# Patient Record
Sex: FEMALE | Race: WHITE | NOT HISPANIC OR LATINO | Employment: UNEMPLOYED | URBAN - METROPOLITAN AREA
[De-identification: names, ages, dates, MRNs, and addresses within clinical notes are randomized per-mention and may not be internally consistent; named-entity substitution may affect disease eponyms.]

---

## 2017-01-04 ENCOUNTER — TELEPHONE (OUTPATIENT)
Dept: FAMILY MEDICINE CLINIC | Facility: CLINIC | Age: 57
End: 2017-01-04

## 2017-01-04 NOTE — TELEPHONE ENCOUNTER
Pt called. She said we scheduled her with dr ellsworth and she talked to you about this. She has seen him before and doesn't want to see him again. Can you please set her up with a different Rheum??

## 2017-01-30 ENCOUNTER — OFFICE VISIT (OUTPATIENT)
Dept: FAMILY MEDICINE CLINIC | Facility: CLINIC | Age: 57
End: 2017-01-30

## 2017-01-30 VITALS
HEART RATE: 91 BPM | WEIGHT: 214 LBS | HEIGHT: 66 IN | BODY MASS INDEX: 34.39 KG/M2 | SYSTOLIC BLOOD PRESSURE: 118 MMHG | RESPIRATION RATE: 16 BRPM | OXYGEN SATURATION: 98 % | DIASTOLIC BLOOD PRESSURE: 70 MMHG

## 2017-01-30 DIAGNOSIS — F51.01 PRIMARY INSOMNIA: ICD-10-CM

## 2017-01-30 DIAGNOSIS — R42 VERTIGO: ICD-10-CM

## 2017-01-30 DIAGNOSIS — H65.02 ACUTE SEROUS OTITIS MEDIA OF LEFT EAR, RECURRENCE NOT SPECIFIED: ICD-10-CM

## 2017-01-30 DIAGNOSIS — I10 ESSENTIAL HYPERTENSION: Primary | ICD-10-CM

## 2017-01-30 PROCEDURE — 99213 OFFICE O/P EST LOW 20 MIN: CPT | Performed by: FAMILY MEDICINE

## 2017-01-30 PROCEDURE — 96372 THER/PROPH/DIAG INJ SC/IM: CPT | Performed by: FAMILY MEDICINE

## 2017-01-30 RX ORDER — MONTELUKAST SODIUM 10 MG/1
10 TABLET ORAL NIGHTLY
Qty: 30 TABLET | Refills: 5 | Status: SHIPPED | OUTPATIENT
Start: 2017-01-30 | End: 2017-05-12

## 2017-01-30 RX ORDER — DEXAMETHASONE SODIUM PHOSPHATE 4 MG/ML
4 INJECTION, SOLUTION INTRA-ARTICULAR; INTRALESIONAL; INTRAMUSCULAR; INTRAVENOUS; SOFT TISSUE ONCE
Status: COMPLETED | OUTPATIENT
Start: 2017-01-30 | End: 2017-01-30

## 2017-01-30 RX ORDER — FLUTICASONE PROPIONATE 50 MCG
2 SPRAY, SUSPENSION (ML) NASAL DAILY
Qty: 1 EACH | Refills: 5 | Status: SHIPPED | OUTPATIENT
Start: 2017-01-30 | End: 2017-03-01

## 2017-01-30 RX ADMIN — DEXAMETHASONE SODIUM PHOSPHATE 4 MG: 4 INJECTION, SOLUTION INTRA-ARTICULAR; INTRALESIONAL; INTRAMUSCULAR; INTRAVENOUS; SOFT TISSUE at 13:54

## 2017-01-30 NOTE — PROGRESS NOTES
Subjective   Candie Arias is a 57 y.o. female presenting with   Chief Complaint   Patient presents with   • Dizziness     having dizzy spells         HPI Comments: 57-year-old  white female nonsmoker comes in today with the complaint that she has a feeling that her left ear is plugged up.  She tells me that she has trouble with chronic allergies and she takes an anti-histamine daily.  She is not on any nasal spray.  She does not see an allergist.    She also says that a couple of days ago she woke up in the early morning hours dizzy.  She said it lasted for a few minutes and she did vomit.  She had this recur yesterday but it only lasted for a few minutes and if she held her head very still she did not vomit.he admits that for some time she has intermittent tinnitus and her  tells her that she is getting hard of hearing.  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    Dizziness          The following portions of the patient's history were reviewed and updated as appropriate: current medications, past family history, past medical history, past social history, past surgical history and problem list.    Review of Systems   HENT: Positive for ear pain and hearing loss.    Neurological: Positive for dizziness.   All other systems reviewed and are negative.      Objective   Physical Exam   Constitutional: She is oriented to person, place, and time. She appears well-developed and well-nourished. No distress.   HENT:   Head: Normocephalic and atraumatic.   Right Ear: External ear normal.   Left Ear: Tympanic membrane is retracted. A middle ear effusion is present.   Nose: Nose normal.   Mouth/Throat: Oropharynx is clear and moist.   Eyes: EOM are normal. Pupils are equal, round, and reactive to light. No scleral icterus.   Neck: Normal range of motion. Neck supple. No thyromegaly present.   Cardiovascular: Normal rate and regular rhythm.    Pulmonary/Chest: Effort normal and breath sounds normal.    Musculoskeletal: Normal range of motion. She exhibits no edema or tenderness.   Lymphadenopathy:     She has no cervical adenopathy.   Neurological: She is alert and oriented to person, place, and time. No cranial nerve deficit. Coordination normal.   Skin: Skin is warm and dry.   Psychiatric: She has a normal mood and affect.   Nursing note and vitals reviewed.      Assessment/Plan   Candie was seen today for dizziness.    Diagnoses and all orders for this visit:    Essential hypertension    Primary insomnia    Acute serous otitis media of left ear, recurrence not specified  -     dexamethasone (DECADRON) injection 4 mg; Inject 1 mL into the shoulder, thigh, or buttocks 1 (One) Time.    Vertigo    Other orders  -     fluticasone (FLONASE) 50 MCG/ACT nasal spray; 2 sprays into each nostril Daily for 30 days.  -     montelukast (SINGULAIR) 10 MG tablet; Take 1 tablet by mouth Every Night.                   I would like him to return for another visit in 6 month(s)

## 2017-01-30 NOTE — MR AVS SNAPSHOT
Candie Arias   1/30/2017 1:45 PM   Office Visit    Dept Phone:  [phone]   Encounter #:  [id number]    Provider:  Kyle Moreira MD   Department:  [facility] PRIMARY CARE                Your Full Care Plan              Today's Medication Changes          These changes are accurate as of: 1/30/17  1:59 PM.  If you have any questions, ask your nurse or doctor.               New Medication(s)Ordered:     fluticasone 50 MCG/ACT nasal spray   Commonly known as:  FLONASE   2 sprays into each nostril Daily for 30 days.   Started by:  Kyle Moreira MD       montelukast 10 MG tablet   Commonly known as:  SINGULAIR   Take 1 tablet by mouth Every Night.   Started by:  Kyle Moreira MD            Where to Get Your Medications      These medications were sent to [id number] [place] [address] - [phone] [facility] [phone]   [id number] [place] [address]     Phone:  [phone]     fluticasone 50 MCG/ACT nasal spray    montelukast 10 MG tablet                  Your Updated Medication List          This list is accurate as of: 1/30/17  1:59 PM.  Always use your most recent med list.                ANTIHISTAMINE PO       diclofenac 75 MG EC tablet   Commonly known as:  VOLTAREN   Take 1 Tab by mouth two times a day.       fluticasone 50 MCG/ACT nasal spray   Commonly known as:  FLONASE   2 sprays into each nostril Daily for 30 days.       lisinopril-hydrochlorothiazide 20-12.5 MG per tablet   Commonly known as:  PRINZIDE,ZESTORETIC   Take 1 tablet by mouth Daily.       montelukast 10 MG tablet   Commonly known as:  SINGULAIR   Take 1 tablet by mouth Every Night.       traZODone 100 MG tablet   Commonly known as:  DESYREL   Take 1 tablet by mouth Every Night.               You Were Diagnosed With        Codes Comments    Essential hypertension    -  Primary ICD-10-CM: I10  ICD-9-CM: 401.9     Primary insomnia     ICD-10-CM: F51.01  ICD-9-CM: 307.42     Acute serous otitis media of left ear, recurrence not specified     ICD-10-CM: H65.02  ICD-9-CM: 381.01     Vertigo     ICD-10-CM: R42  ICD-9-CM: 780.4       Medications to be Given to You by a Medical Professional     Due       Frequency    (none) dexamethasone (DECADRON) injection 4 mg  Once      Instructions    This is a very nice 57-year-old who is here for acute serous otitis media of the left ear along with vertigo.  I will prescribe Flonase and Singulair, but if she does not get relief I would suggest we consult audiology.    She also is not getting much sleep with the trazodone.  If this persists I would suggest we increase the dose.     Patient Instructions History      Upcoming Appointments     Visit Type Date Time Department    OFFICE VISIT 2017  1:45 PM [facility]    OFFICE VISIT 2017  1:15 PM [facility]    OFFICE VISIT 2017  1:30 PM [facility]      [employer] Signup     [employer] allows you to send messages to your doctor, view your test results, renew your prescriptions, schedule appointments, and more. To sign up, go to [employer] and click on the Sign Up Now link in the New User? box. Enter your [employer] Activation Code exactly as it appears below along with the last four digits of your Social Security Number and your Date of Birth () to complete the sign-up process. If you do not sign up before the expiration date, you must request a new code.    [employer] Activation Code: [email or web address]  Expires: 2017  1:59 PM    If you have questions, you can email [email or web address] or call [phone] to talk to our [employer] staff. Remember, [employer] is NOT to be used for urgent needs. For medical emergencies, dial 911.               Other Info from Your Visit           Your Appointments     2017  1:15 PM EST   Office Visit with Yen Correa MD   [facility]  "[facility] (--)    [address]  [facility] [address] [phone]           Arrive 15 minutes prior to appointment.            May 12, 2017  1:30 PM EDT   Office Visit with Ana Duque MD   [facility] (--)    [address] [facility] [place] [address] [phone]           Arrive 15 minutes prior to appointment.              Allergies     Rocephin [Ceftriaxone]      INDUCED FEVER      Reason for Visit     Dizziness having dizzy spells       Vital Signs     Blood Pressure Pulse Respirations Height Weight Oxygen Saturation    118/70 91 16 66\" (167.6 cm) 214 lb (97.1 kg) 98%    Breastfeeding? Body Mass Index Smoking Status             No 34.54 kg/m2 Never Smoker         Problems and Diagnoses Noted     Middle ear infection    High blood pressure    Difficulty falling or staying asleep    Vertigo      Medications Administered     dexamethasone (DECADRON) injection 4 mg                      "

## 2017-01-30 NOTE — PATIENT INSTRUCTIONS
This is a very nice 57-year-old who is here for acute serous otitis media of the left ear along with vertigo.  I will prescribe Flonase and Singulair, but if she does not get relief I would suggest we consult audiology.    She also is not getting much sleep with the trazodone.  If this persists I would suggest we increase the dose.

## 2017-01-31 ENCOUNTER — OFFICE VISIT (OUTPATIENT)
Dept: ORTHOPEDIC SURGERY | Facility: CLINIC | Age: 57
End: 2017-01-31

## 2017-01-31 VITALS — BODY MASS INDEX: 33.75 KG/M2 | HEIGHT: 66 IN | TEMPERATURE: 98.6 F | WEIGHT: 210 LBS

## 2017-01-31 DIAGNOSIS — M25.561 CHRONIC PAIN OF RIGHT KNEE: Primary | ICD-10-CM

## 2017-01-31 DIAGNOSIS — T84.84XA PAINFUL TOTAL KNEE REPLACEMENT, INITIAL ENCOUNTER (HCC): ICD-10-CM

## 2017-01-31 DIAGNOSIS — Z96.652 HX OF TOTAL KNEE ARTHROPLASTY, LEFT: ICD-10-CM

## 2017-01-31 DIAGNOSIS — G89.29 CHRONIC PAIN OF RIGHT KNEE: Primary | ICD-10-CM

## 2017-01-31 DIAGNOSIS — Z96.659 PAINFUL TOTAL KNEE REPLACEMENT, INITIAL ENCOUNTER (HCC): ICD-10-CM

## 2017-01-31 PROCEDURE — 73562 X-RAY EXAM OF KNEE 3: CPT | Performed by: ORTHOPAEDIC SURGERY

## 2017-01-31 PROCEDURE — 99203 OFFICE O/P NEW LOW 30 MIN: CPT | Performed by: ORTHOPAEDIC SURGERY

## 2017-02-06 ENCOUNTER — HOSPITAL ENCOUNTER (OUTPATIENT)
Dept: GENERAL RADIOLOGY | Facility: HOSPITAL | Age: 57
Discharge: HOME | End: 2017-02-06
Admitting: INTERNAL MEDICINE

## 2017-02-06 ENCOUNTER — HOSPITAL ENCOUNTER (OUTPATIENT)
Dept: NUCLEAR MEDICINE | Facility: HOSPITAL | Age: 57
Discharge: HOME | End: 2017-02-06
Attending: ORTHOPAEDIC SURGERY

## 2017-02-06 ENCOUNTER — LAB (OUTPATIENT)
Dept: LAB | Facility: HOSPITAL | Age: 57
End: 2017-02-06

## 2017-02-06 DIAGNOSIS — G89.29 CHRONIC PAIN OF RIGHT KNEE: ICD-10-CM

## 2017-02-06 DIAGNOSIS — Z96.652 HX OF TOTAL KNEE ARTHROPLASTY, LEFT: ICD-10-CM

## 2017-02-06 DIAGNOSIS — R52 PAIN: ICD-10-CM

## 2017-02-06 DIAGNOSIS — M25.561 CHRONIC PAIN OF RIGHT KNEE: ICD-10-CM

## 2017-02-06 LAB
BASOPHILS # BLD AUTO: 0.03 10*3/MM3 (ref 0–0.2)
BASOPHILS NFR BLD AUTO: 0.4 % (ref 0–1.5)
CRP SERPL-MCNC: 0.56 MG/DL (ref 0–0.5)
DEPRECATED RDW RBC AUTO: 44 FL (ref 37–54)
EOSINOPHIL # BLD AUTO: 0.18 10*3/MM3 (ref 0–0.7)
EOSINOPHIL NFR BLD AUTO: 2.5 % (ref 0.3–6.2)
ERYTHROCYTE [DISTWIDTH] IN BLOOD BY AUTOMATED COUNT: 14 % (ref 11.7–13)
ERYTHROCYTE [SEDIMENTATION RATE] IN BLOOD: 4 MM/HR (ref 0–30)
HCT VFR BLD AUTO: 43.2 % (ref 35.6–45.5)
HGB BLD-MCNC: 14.2 G/DL (ref 11.9–15.5)
IMM GRANULOCYTES # BLD: 0.04 10*3/MM3 (ref 0–0.03)
IMM GRANULOCYTES NFR BLD: 0.6 % (ref 0–0.5)
LYMPHOCYTES # BLD AUTO: 1.37 10*3/MM3 (ref 0.9–4.8)
LYMPHOCYTES NFR BLD AUTO: 19.1 % (ref 19.6–45.3)
MCH RBC QN AUTO: 28.3 PG (ref 26.9–32)
MCHC RBC AUTO-ENTMCNC: 32.9 G/DL (ref 32.4–36.3)
MCV RBC AUTO: 86.2 FL (ref 80.5–98.2)
MONOCYTES # BLD AUTO: 0.39 10*3/MM3 (ref 0.2–1.2)
MONOCYTES NFR BLD AUTO: 5.4 % (ref 5–12)
NEUTROPHILS # BLD AUTO: 5.18 10*3/MM3 (ref 1.9–8.1)
NEUTROPHILS NFR BLD AUTO: 72 % (ref 42.7–76)
PLATELET # BLD AUTO: 266 10*3/MM3 (ref 140–500)
PMV BLD AUTO: 9.7 FL (ref 6–12)
RBC # BLD AUTO: 5.01 10*6/MM3 (ref 3.9–5.2)
WBC NRBC COR # BLD: 7.19 10*3/MM3 (ref 4.5–10.7)

## 2017-02-06 PROCEDURE — 73030 X-RAY EXAM OF SHOULDER: CPT

## 2017-02-06 PROCEDURE — 0 TECHNETIUM MEDRONATE KIT: Performed by: ORTHOPAEDIC SURGERY

## 2017-02-06 PROCEDURE — 78315 BONE IMAGING 3 PHASE: CPT

## 2017-02-06 PROCEDURE — 85652 RBC SED RATE AUTOMATED: CPT

## 2017-02-06 PROCEDURE — A9503 TC99M MEDRONATE: HCPCS | Performed by: ORTHOPAEDIC SURGERY

## 2017-02-06 PROCEDURE — 36415 COLL VENOUS BLD VENIPUNCTURE: CPT

## 2017-02-06 PROCEDURE — 86140 C-REACTIVE PROTEIN: CPT

## 2017-02-06 PROCEDURE — 85025 COMPLETE CBC W/AUTO DIFF WBC: CPT

## 2017-02-06 RX ORDER — TC 99M MEDRONATE 20 MG/10ML
22.5 INJECTION, POWDER, LYOPHILIZED, FOR SOLUTION INTRAVENOUS
Status: COMPLETED | OUTPATIENT
Start: 2017-02-06 | End: 2017-02-06

## 2017-02-06 RX ADMIN — Medication 22.5 MILLICURIE: at 09:00

## 2017-02-07 ENCOUNTER — TELEPHONE (OUTPATIENT)
Dept: FAMILY MEDICINE CLINIC | Facility: CLINIC | Age: 57
End: 2017-02-07

## 2017-02-07 NOTE — TELEPHONE ENCOUNTER
Pt called stating she is still dizzy with her ear still feeling like it is plugged up states  it has been a week that she was treated for a ear infection what to do now?

## 2017-02-07 NOTE — TELEPHONE ENCOUNTER
Called pt informed her of answer per dr santoro she said she will call back on Monday and leave a message with dr smith

## 2017-02-21 ENCOUNTER — LAB REQUISITION (OUTPATIENT)
Dept: LAB | Facility: HOSPITAL | Age: 57
End: 2017-02-21

## 2017-02-21 ENCOUNTER — CONSULT (OUTPATIENT)
Dept: ORTHOPEDIC SURGERY | Facility: CLINIC | Age: 57
End: 2017-02-21

## 2017-02-21 VITALS — TEMPERATURE: 97.1 F | HEIGHT: 66 IN | WEIGHT: 210 LBS | BODY MASS INDEX: 33.75 KG/M2

## 2017-02-21 DIAGNOSIS — G89.29 CHRONIC PAIN OF LEFT KNEE: Primary | ICD-10-CM

## 2017-02-21 DIAGNOSIS — Z00.00 ENCOUNTER FOR GENERAL ADULT MEDICAL EXAMINATION WITHOUT ABNORMAL FINDINGS: ICD-10-CM

## 2017-02-21 DIAGNOSIS — M25.562 CHRONIC PAIN OF LEFT KNEE: Primary | ICD-10-CM

## 2017-02-21 PROCEDURE — 89050 BODY FLUID CELL COUNT: CPT | Performed by: NURSE PRACTITIONER

## 2017-02-21 PROCEDURE — 87205 SMEAR GRAM STAIN: CPT | Performed by: NURSE PRACTITIONER

## 2017-02-21 PROCEDURE — 87015 SPECIMEN INFECT AGNT CONCNTJ: CPT | Performed by: NURSE PRACTITIONER

## 2017-02-21 PROCEDURE — 87070 CULTURE OTHR SPECIMN AEROBIC: CPT | Performed by: NURSE PRACTITIONER

## 2017-02-21 PROCEDURE — 99203 OFFICE O/P NEW LOW 30 MIN: CPT | Performed by: NURSE PRACTITIONER

## 2017-02-21 RX ORDER — PIROXICAM 10 MG/1
10 CAPSULE ORAL DAILY
COMMUNITY
End: 2017-05-12

## 2017-02-21 NOTE — PROGRESS NOTES
Patient: Candie Arias  YOB: 1960 57 y.o. female  Medical Record Number: [id number]    Chief Complaints:   Chief Complaint   Patient presents with   • Left Knee - Pain       History of Present Illness:Candie Arias is a 57 y.o. female who presents with complaints of increased left knee pain and stiffness.  The patient had a previous left total knee replacement approximately 2 and half years ago by Dr. Barney.  Within the first week of surgery the patient ended up with an infection and then had an I&D and poly-exchange as well as 6 weeks worth of IV antibiotics.  She then was taken off all antibiotics with no further infection workup at that time.  Patient saw the surgeon about a year ago for follow-up for her knee, was having some stiffness, and was told she had some scar tissue and extra bone that had formed and that surgery at that point really wasn't an option.  She never went back.  She recently saw her family doctor who told her to come here for second opinion.  Patient's major complaint is increased pain and stiffness but has progressively gotten worse, worse over the last 3-4 months.  Patient did have a fall last year or reports the symptoms are going on prior to the fall.  She denies any fever or chills.  She saw Dr. Correa a couple weeks ago and the patient had a normal sedimentation rate only mild elevated CRP and a normal total body bone scan.  No knee aspiration has been done yet.    Allergies:   Allergies   Allergen Reactions   • Rocephin [Ceftriaxone] Other (See Comments)     FEVER  INDUCED FEVER       Medications:   Current Outpatient Prescriptions   Medication Sig Dispense Refill   • piroxicam (FELDENE) 10 MG capsule Take 10 mg by mouth Daily.     • fluticasone (FLONASE) 50 MCG/ACT nasal spray 2 sprays into each nostril Daily for 30 days. 1 each 5   • lisinopril-hydrochlorothiazide (PRINZIDE,ZESTORETIC) 20-12.5 MG per tablet Take 1 tablet by mouth Daily. 90 tablet 1   •  "montelukast (SINGULAIR) 10 MG tablet Take 1 tablet by mouth Every Night. 30 tablet 5   • traZODone (DESYREL) 100 MG tablet Take 1 tablet by mouth Every Night. 30 tablet 5   • Triprolidine-Pseudoephedrine (ANTIHISTAMINE PO) Take by mouth.       No current facility-administered medications for this visit.          The following portions of the patient's history were reviewed and updated as appropriate: allergies, current medications, past family history, past medical history, past social history, past surgical history and problem list.    Review of Systems:   A 14 point review of systems was performed. All systems negative except pertinent positives/negative listed in HPI above    Physical Exam:   Vitals:    02/21/17 1359   Temp: 97.1 °F (36.2 °C)   Weight: 210 lb (95.3 kg)   Height: 66\" (167.6 cm)       General: A and O x 3, ASA, NAD    SCLERA:    Normal    DENTITION:   Normal  Skin clear no unusual lesions noted  Left knee patient has a well-healed midline surgical incision noted 83° flexion neutron extension with no appreciable effusion noted.  No instability.  Calf is soft and nontender.    Radiology:  Xrays 3views (ap,lateral, sunrise) left knee were ordered and reviewed today secondary to pain and stiffness and show well-placed well positioned left total knee replacement with calcifications and heterotopic ossifications noted about the patella.  No obvious signs of loosening.  Comparative views are unchanged    Assessment/Plan:  Painful and stiff left total knee replacement with a history of infection    Dr. No saw the patient as well.  At this point we need to completely rule out infection as the primary cause of the patient's symptoms.  We were able to aspirate 10 cc of clear fluid from her knee sent off for cell count and culture and sensitivity.  Patient will follow-up with Dr. No in 2 weeks to discuss results and options.  "

## 2017-02-22 LAB
APPEARANCE FLD: ABNORMAL
COLOR FLD: ABNORMAL
METHOD: ABNORMAL
NUC CELL # FLD: 3 /MM3
RBC # FLD AUTO: 4350 /MM3

## 2017-02-27 LAB
BACTERIA FLD CULT: NORMAL
GRAM STN SPEC: NORMAL

## 2017-03-07 ENCOUNTER — OFFICE VISIT (OUTPATIENT)
Dept: ORTHOPEDIC SURGERY | Facility: CLINIC | Age: 57
End: 2017-03-07

## 2017-03-07 VITALS — BODY MASS INDEX: 33.75 KG/M2 | HEIGHT: 66 IN | WEIGHT: 210 LBS

## 2017-03-07 DIAGNOSIS — Z96.652 STATUS POST TOTAL LEFT KNEE REPLACEMENT: Primary | ICD-10-CM

## 2017-03-07 PROCEDURE — 99212 OFFICE O/P EST SF 10 MIN: CPT | Performed by: ORTHOPAEDIC SURGERY

## 2017-03-07 NOTE — PROGRESS NOTES
"Patient: Candie Arias  YOB: 1960 57 y.o. female  Medical Record Number: [id number]    Chief Complaints:   Chief Complaint   Patient presents with   • Left Knee - Follow-up       History of Present Illness:Candie Arias is a 57 y.o. female who presents for follow-up of  of her left total knee replacement.  She had labs drawn is here for evaluation.  She still complaining primarily of stiffness and discomfort with flexion of the knee.    Allergies:   Allergies   Allergen Reactions   • Rocephin [Ceftriaxone] Other (See Comments)     FEVER  INDUCED FEVER       Medications:   Current Outpatient Prescriptions   Medication Sig Dispense Refill   • lisinopril-hydrochlorothiazide (PRINZIDE,ZESTORETIC) 20-12.5 MG per tablet Take 1 tablet by mouth Daily. 90 tablet 1   • montelukast (SINGULAIR) 10 MG tablet Take 1 tablet by mouth Every Night. 30 tablet 5   • piroxicam (FELDENE) 10 MG capsule Take 10 mg by mouth Daily.     • traZODone (DESYREL) 100 MG tablet Take 1 tablet by mouth Every Night. 30 tablet 5   • Triprolidine-Pseudoephedrine (ANTIHISTAMINE PO) Take by mouth.       No current facility-administered medications for this visit.          The following portions of the patient's history were reviewed and updated as appropriate: allergies, current medications, past family history, past medical history, past social history, past surgical history and problem list.    Review of Systems:   A 14 point review of systems was performed. All systems negative except pertinent positives/negative listed in HPI above    Physical Exam:   Vitals:    03/07/17 1130   Weight: 210 lb (95.3 kg)   Height: 66\" (167.6 cm)       General: A and O x 3, ASA, NAD    SCLERA:    Normal    DENTITION:   Normal  Knee is fairly stiff she flexes about 90° there is no obvious effusion the skin looks fine today    Radiology:  Xrays 3views (ap,lateral, sunrise) taken previously demonstratinga well positioned knee replacement without " evidence of wear, loosening or osteolysis.  There is calcification of the patellar tendon and the quadricep tendon most evident on the lateral view    Sedimentation rate and C-reactive protein are both normal.  The knee aspiration shows really red blood cells and is not consistent with infection    Assessment/Plan:  Stiff left total knee secondary to scar tissue and calcification of the extensor mechanism.  I recommended against any further surgery.  I recommended stationary bike and working on stretching.  We'll see her back on a when necessary basis.

## 2017-04-05 ENCOUNTER — OFFICE VISIT (OUTPATIENT)
Dept: FAMILY MEDICINE CLINIC | Facility: CLINIC | Age: 57
End: 2017-04-05

## 2017-04-05 VITALS
HEIGHT: 66 IN | WEIGHT: 210 LBS | SYSTOLIC BLOOD PRESSURE: 136 MMHG | HEART RATE: 117 BPM | BODY MASS INDEX: 33.75 KG/M2 | TEMPERATURE: 98.3 F | RESPIRATION RATE: 16 BRPM | DIASTOLIC BLOOD PRESSURE: 74 MMHG

## 2017-04-05 DIAGNOSIS — H65.02 ACUTE SEROUS OTITIS MEDIA OF LEFT EAR, RECURRENCE NOT SPECIFIED: ICD-10-CM

## 2017-04-05 DIAGNOSIS — R42 VERTIGO: ICD-10-CM

## 2017-04-05 DIAGNOSIS — T84.84XS PAINFUL TOTAL KNEE REPLACEMENT, SEQUELA: ICD-10-CM

## 2017-04-05 DIAGNOSIS — Z96.659 PAINFUL TOTAL KNEE REPLACEMENT, SEQUELA: ICD-10-CM

## 2017-04-05 DIAGNOSIS — I10 ESSENTIAL HYPERTENSION: Primary | ICD-10-CM

## 2017-04-05 PROCEDURE — 99213 OFFICE O/P EST LOW 20 MIN: CPT | Performed by: FAMILY MEDICINE

## 2017-04-05 RX ORDER — LORATADINE 10 MG/1
CAPSULE, LIQUID FILLED ORAL
COMMUNITY
End: 2021-05-24

## 2017-04-05 NOTE — PROGRESS NOTES
Subjective   Candie Arias is a 57 y.o. female presenting with   Chief Complaint   Patient presents with   • Dizziness     recurrent dizzy spells    • URI     head congestion  sore throat  headache         HPI Comments: 57-year-old  white female nonsmoker comes in today for recurrent vertigo.  She has found if she moves her head rapidly in one direction she can set off her dizzy spells.  However they normally are extremely transient.  She normally sleeps on her right side and 2 weeks ago woke up in the middle of the night with severe vertigo.  She does not know for sure which side she was on when this happened but for 2 hours she had to lie on the floor with her eyes closed or else she was throwing up.  She is on the Singulair and Claritin but still has head congestion.  She does not have any hearing loss or tinnitus.    She also went to a second orthopedist for consult on her left knee.  She says it hurts all the time and she has a lot of trouble with stairs.  If she sits for very long it becomes extremely stiff and sore as well.  The second orthopedist agreed with the first that there was very limited she can do.  However she is now seeing a new rheumatologist who told her she knows of a specialist in [place] who is known to be able to fix knee replacements that have gone bad.  I told her that if this is affecting her quality of life that badly I would consider having her  drive her to [place].    Dizziness   Associated symptoms include arthralgias and congestion.   URI    Associated symptoms include congestion and rhinorrhea.        The following portions of the patient's history were reviewed and updated as appropriate: current medications, past family history, past medical history, past social history, past surgical history and problem list.    Review of Systems   HENT: Positive for congestion and rhinorrhea.    Musculoskeletal: Positive for arthralgias.   Neurological: Positive for dizziness.    All other systems reviewed and are negative.      Objective   Physical Exam   Constitutional: She is oriented to person, place, and time. She appears well-developed and well-nourished.   HENT:   Head: Normocephalic and atraumatic.   Left Ear: A middle ear effusion is present.   Nose: Mucosal edema and rhinorrhea (clear rhinorrhea) present.   Eyes: EOM are normal. Pupils are equal, round, and reactive to light. No scleral icterus.   Neck: Normal range of motion. Neck supple. No JVD present. No thyromegaly present.   Cardiovascular: Regular rhythm, normal heart sounds and intact distal pulses.  Tachycardia present.    No murmur heard.  Pulmonary/Chest: Effort normal and breath sounds normal. No respiratory distress.   Musculoskeletal: She exhibits tenderness (tenderness to range of motion of left knee without any gross instability). She exhibits no edema or deformity.   Lymphadenopathy:     She has no cervical adenopathy.   Neurological: She is alert and oriented to person, place, and time. No cranial nerve deficit. Coordination normal.   Skin: Skin is warm and dry.   Psychiatric: She has a normal mood and affect. Her behavior is normal.   Nursing note and vitals reviewed.      Assessment/Plan   Candie was seen today for dizziness and uri.    Diagnoses and all orders for this visit:    Essential hypertension    Vertigo  -     Ambulatory Referral to ENT (Otolaryngology)    Painful total knee replacement, sequela    Acute serous otitis media of left ear, recurrence not specified                   I would like him to return for another visit in 6 month(s)

## 2017-04-05 NOTE — PATIENT INSTRUCTIONS
This is a very nice 57-year-old who is here for recurrent vertigo.  Because this has become more frequent and more intense, I will request an ENT consult.

## 2017-04-13 ENCOUNTER — TELEPHONE (OUTPATIENT)
Dept: FAMILY MEDICINE CLINIC | Facility: CLINIC | Age: 57
End: 2017-04-13

## 2017-04-13 NOTE — TELEPHONE ENCOUNTER
Pt is having a balance test and she has to stop taking medication,she is asking how to stop taking the trazodone?   Does she need to wean her self off of it or can she just stop taking it?

## 2017-05-02 RX ORDER — LISINOPRIL AND HYDROCHLOROTHIAZIDE 20; 12.5 MG/1; MG/1
TABLET ORAL
Qty: 30 TABLET | Refills: 5 | Status: SHIPPED | OUTPATIENT
Start: 2017-05-02 | End: 2017-10-27 | Stop reason: SDUPTHER

## 2017-05-12 ENCOUNTER — OFFICE VISIT (OUTPATIENT)
Dept: SURGERY | Facility: CLINIC | Age: 57
End: 2017-05-12

## 2017-05-12 VITALS
DIASTOLIC BLOOD PRESSURE: 69 MMHG | HEIGHT: 66 IN | OXYGEN SATURATION: 98 % | BODY MASS INDEX: 33.62 KG/M2 | WEIGHT: 209.2 LBS | SYSTOLIC BLOOD PRESSURE: 101 MMHG | HEART RATE: 83 BPM

## 2017-05-12 DIAGNOSIS — N60.92 ATYPICAL LOBULAR HYPERPLASIA OF LEFT BREAST: ICD-10-CM

## 2017-05-12 DIAGNOSIS — Z80.3 FH: BREAST CANCER: ICD-10-CM

## 2017-05-12 DIAGNOSIS — N60.92 ATYPICAL DUCTAL HYPERPLASIA OF LEFT BREAST: ICD-10-CM

## 2017-05-12 DIAGNOSIS — D05.91 CARCINOMA IN SITU OF RIGHT BREAST: Primary | ICD-10-CM

## 2017-05-12 PROCEDURE — 99213 OFFICE O/P EST LOW 20 MIN: CPT | Performed by: SURGERY

## 2017-05-12 RX ORDER — CELECOXIB 200 MG/1
CAPSULE ORAL
COMMUNITY
Start: 2017-04-10 | End: 2020-09-09 | Stop reason: SDUPTHER

## 2017-05-18 ENCOUNTER — TELEPHONE (OUTPATIENT)
Dept: SURGERY | Facility: CLINIC | Age: 57
End: 2017-05-18

## 2017-06-12 ENCOUNTER — DOCUMENTATION (OUTPATIENT)
Dept: NUTRITION | Facility: HOSPITAL | Age: 57
End: 2017-06-12

## 2017-06-12 VITALS — HEIGHT: 66 IN | BODY MASS INDEX: 33.59 KG/M2 | WEIGHT: 209 LBS

## 2017-06-12 NOTE — PROGRESS NOTES
Adult Outpatient Nutrition  Assessment/PES    Patient Name:  Candie Arias  YOB: 1960  MRN: [id number]    Assessment Date:  6/12/2017     Comments:  [facility] healthy nutrition for breast cancer patients. The patient would like to be able to lose weight.  She has decreased her intake of sodium successfully over the last 6 weeks due to issues with vertigo.  She has knee issues and is unable to exercise but has used a stationary bike in the past. Encouraged patient to repair the bike they have or get a new one so that she can include exercise daily.  We discussed a low fat diet, with emphasis on plant based foods.  She was provided with meal planning tools, shopping lists, portion control and ideas for meals. Encouraged patient to increase intake of less calorically dense foods and decrease intake of high fat meats and snack foods.  Finally I encouraged the patient to track daily intake and exercise using a food log or an moiz, or computer.    Will be available as needed for any questions.              General Info       06/12/17 1057    Today's Session    Person(s) attending today's session Patient    General Information    Oncology patient? yes   h/o Breast cancer            Physical Findings       06/12/17 1059    Physical Findings/Assessment    Additional Documentation Physical Appearance (Group)    Physical Appearance    Overall Physical Appearance overweight;obese              Nutritional Info/Activity       06/12/17 1059    Nutritional Information    Have you had weight changes? No    Have you tried to lose weight before? Yes    List programs tried, date, and success Weight Watchers, lost 40#, gained back    Physical Activity    Are you currently involved in an activity/exercise program?  No    Reasons for Inactivity Other (comment)   has a stationary bike--needs replacement            Home Nutrition Report       06/12/17 1101    Home Nutrition  Report    Diet No specific            Estimated/Assessed Needs       06/12/17 1101    Calculation Measurements    Weight Used For Calculations 94.8 kg (209 lb)    Estimated/Assessed Energy Needs    Energy Need Method Kcal/kg    kcal/kg 15    15 Kcal/Kg (kcal) 1422.03    Estimated/Assessed Protein Needs    Weight Used for Protein Calculation 94.8 kg (209 lb)    Protein (gm/kg) 0.8    0.8 Gm Protein (gm) 75.84              Labs/Tests/Procedures/Meds       06/12/17 1101    Labs/Tests/Procedures/Meds    Diagnostic Test/Procedure Review reviewed    Labs/Tests Review Reviewed    Medication Review Reviewed, pertinent    Significant Vitals reviewed            Problem/Interventions:        Problem 1       06/12/17 1101    Nutrition Diagnoses Problem 1    Problem 1 Overweight/Obesity    Signs/Symptoms (evidenced by) Demonstrated Information Deficit                    Intervention Goal       06/12/17 1101    Intervention Goal    General Provide information regarding MNT for treatment/condition    Weight Appropriate weight loss                Electronically signed by:  Sherri Solano RD  06/12/17 11:02 AM

## 2017-10-27 RX ORDER — LISINOPRIL AND HYDROCHLOROTHIAZIDE 20; 12.5 MG/1; MG/1
TABLET ORAL
Qty: 30 TABLET | Refills: 0 | Status: SHIPPED | OUTPATIENT
Start: 2017-10-27 | End: 2017-11-28 | Stop reason: SDUPTHER

## 2017-11-29 RX ORDER — LISINOPRIL AND HYDROCHLOROTHIAZIDE 20; 12.5 MG/1; MG/1
TABLET ORAL
Qty: 30 TABLET | Refills: 2 | Status: SHIPPED | OUTPATIENT
Start: 2017-11-29 | End: 2018-02-12 | Stop reason: SDUPTHER

## 2018-02-12 ENCOUNTER — OFFICE VISIT (OUTPATIENT)
Dept: FAMILY MEDICINE CLINIC | Facility: CLINIC | Age: 58
End: 2018-02-12

## 2018-02-12 VITALS
SYSTOLIC BLOOD PRESSURE: 128 MMHG | TEMPERATURE: 98.6 F | HEIGHT: 66 IN | BODY MASS INDEX: 35.74 KG/M2 | OXYGEN SATURATION: 95 % | DIASTOLIC BLOOD PRESSURE: 78 MMHG | WEIGHT: 222.4 LBS | HEART RATE: 114 BPM | RESPIRATION RATE: 18 BRPM

## 2018-02-12 DIAGNOSIS — I10 ESSENTIAL HYPERTENSION: Primary | ICD-10-CM

## 2018-02-12 PROCEDURE — 99213 OFFICE O/P EST LOW 20 MIN: CPT | Performed by: FAMILY MEDICINE

## 2018-02-12 RX ORDER — LISINOPRIL AND HYDROCHLOROTHIAZIDE 20; 12.5 MG/1; MG/1
1 TABLET ORAL DAILY
Qty: 90 TABLET | Refills: 1 | Status: SHIPPED | OUTPATIENT
Start: 2018-02-12 | End: 2018-08-24 | Stop reason: SDUPTHER

## 2018-02-12 NOTE — PROGRESS NOTES
Subjective   Candie Arias is a 58 y.o. female presenting with   Chief Complaint   Patient presents with   • Hypertension        HPI Comments: 58-year-old white female nonsmoker here for routine follow-up for high blood pressure.  Her blood pressures well-controlled and she does not have any side effects from her medication.    She does see a rheumatologist named Dr. Castillo who does blood work on her on a regular basis.  She declines any further blood work today because she says she just had some drawn.  I did ask her to have that physician sent over a copy of the most recent lab.  He is looking into consult thing Dr. Sorto to have her left knee replacement redone since it hurts all the time.    Hypertension          The following portions of the patient's history were reviewed and updated as appropriate: current medications, past family history, past medical history, past social history, past surgical history and problem list.    Review of Systems   Musculoskeletal: Positive for arthralgias and gait problem.   All other systems reviewed and are negative.      Objective   Physical Exam   Constitutional: She is oriented to person, place, and time. She appears well-developed and well-nourished. No distress.   HENT:   Head: Normocephalic and atraumatic.   Eyes: EOM are normal. Pupils are equal, round, and reactive to light.   Neck: Normal range of motion. Neck supple. No thyromegaly present.   Cardiovascular: Normal rate, regular rhythm, normal heart sounds and intact distal pulses.  Exam reveals no friction rub.    No murmur heard.  Pulmonary/Chest: Effort normal and breath sounds normal. No respiratory distress. She has no wheezes.   Musculoskeletal: Normal range of motion. She exhibits tenderness (tenderness in the left knee).   Lymphadenopathy:     She has no cervical adenopathy.   Neurological: She is alert and oriented to person, place, and time.   Skin: Skin is warm and dry. She is not diaphoretic.    Psychiatric: She has a normal mood and affect. Her behavior is normal.   Nursing note and vitals reviewed.      Assessment/Plan   Candie was seen today for hypertension.    Diagnoses and all orders for this visit:    Essential hypertension    Other orders  -     lisinopril-hydrochlorothiazide (PRINZIDE,ZESTORETIC) 20-12.5 MG per tablet; Take 1 tablet by mouth Daily.                   I would like him to return for another visit in 6 month(s)

## 2018-02-12 NOTE — PATIENT INSTRUCTIONS
This is a very nice 58-year-old who is here for follow-up for her blood pressure.  It appears to be very well controlled and she does not have any side effects.  I would like her to call if there is a problem.

## 2018-05-10 ENCOUNTER — OFFICE VISIT (OUTPATIENT)
Dept: SURGERY | Facility: CLINIC | Age: 58
End: 2018-05-10

## 2018-05-10 VITALS
HEIGHT: 66 IN | BODY MASS INDEX: 35.36 KG/M2 | WEIGHT: 220 LBS | DIASTOLIC BLOOD PRESSURE: 76 MMHG | SYSTOLIC BLOOD PRESSURE: 138 MMHG

## 2018-05-10 DIAGNOSIS — Z80.3 FH: BREAST CANCER: ICD-10-CM

## 2018-05-10 DIAGNOSIS — N60.99 ATYPICAL LOBULAR HYPERPLASIA (ALH) OF BREAST: ICD-10-CM

## 2018-05-10 DIAGNOSIS — N60.99 ATYPICAL DUCTAL HYPERPLASIA OF BREAST: ICD-10-CM

## 2018-05-10 DIAGNOSIS — D05.11 DUCTAL CARCINOMA IN SITU (DCIS) OF RIGHT BREAST: Primary | ICD-10-CM

## 2018-05-10 PROCEDURE — 99212 OFFICE O/P EST SF 10 MIN: CPT | Performed by: SURGERY

## 2018-05-10 RX ORDER — NABUMETONE 500 MG/1
TABLET, FILM COATED ORAL
COMMUNITY
Start: 2018-05-04 | End: 2019-05-16

## 2018-05-10 NOTE — PROGRESS NOTES
Chief Complaint: Candie Arias is a 58 y.o. female who was seen in consultation at the request of No ref. provider found  for Breast cancer and a followup visit    History of Present Illness:  The patient comes today  reporting : abnormal LEFT breast imaging and biopsy report.   She had not noted any masses, skin changes, nipple changes, nipple discharge either breast at the time of her most recent imaging.  She had the below imaging and was found to have LEFT breast atypical hyperplasia bordering on DCIS, as well as ALH.  Her most recent imaging includes the followin-21-12 Screening mammogram [facility] Hugo Schreiber  There are scattered fibroglandular densities.   Finding 1: There are several punctate and fine granular calcifications with grouped distribution seen in the middle one-third upper outer region of the left breast. There is an increased number of calcifications.   Finding 2: Stable isodense, oval mass measuring 11 millimeters lower inner region of the left breast.   Finding 3: Stable isodense, oval mass measuring 11 millimeteres upper outer region of the right breast. Note is made of a biopsy clip as evidence of a previous surgical procedure.   Finding 4: Punctate and spherical calcifications seen in both breasts.   Impression:   Finding 1: Calcifications in the left breast require additional evaluation.   Finding 2: Benign-Negative.   Finding 3: Benign-Negative.   Finding 4: Benign-Negative.   BI-RADS Category 0    04-10-12 Left breast mammography [facility]  Impression:   Calcifications in the left breats are suspicious, stereotactic biopsy is recommended.   BIRADS Category 4B    Her most recent imaging prior to the above was: in     She then had the following biopsy:    12 Stereotactic biopsy [facility]  12 core needle biopsy specimens. 9 gauge vacuum assisted device. Marker clip was deployed.     12 Pathology results [facility]  Diagnosis:   Left  breast upper outer quadrant.  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   Atypical lobular hyperplasia (ALH) is also noted.   Surounding breast parenchyma demonstrates pseudoangiomatous stromal hyperplasia, columnar cell hyperplasia, apocrine change, and fibrocystic changes.     She had a benign RIGHT breast biopsy in the past.  She is postmenopausal, has her uterus and ovaries and takes no hormones.   She has 4 sisters, 2 maternal aunts, 3 paternal aunts, one son. A niece  (her sister's daughter) had breast cancer diagnosed at age 38. That same sister had a son who  from testicular cancer in his 20s. No other breast and no ovarian cancer in her family.    Since our last visit, Mrs. Arias had her RIGHT breast MRI biopsy 12, which returned as benign. She has healed with some ecchymoses RIGHT breast.  The pathology returned as benign tissue with CCC and FCC.  She has seen Dr Kirkpatrick and he has sent a full series of labs for hypercoagulability- she has followup with him next week. Her cousin has had BRCA testing and was BRCA negative.     We excised her ADH/DCIS on 12. She has done well since that procedure with no complaints. Her pathology returned as additional ADH and ALH, no DCIS. margins clear. I did send her core biopsy pathology for review at [place] to determine whether any DCIS present. The report showed atypical hyperplasia only.    SInce our last visit, Mrs. Arias has done well.  She has noted no changes in her breast exam. No new masses, skin changes, nipple changes, nipple discharge either breast.   She started tamoxifen 12 Dr. Sexton and is tolerating this.  She was seen by Dr. Mederos and felt not to need radiation based on pathology review.    She had 2 areas of SCC removed from her nose, Dr. Abrams in .  Her most recent  imaging includes the followin/22/13       Bilateral Diagnostic Mammogram       [facility]       Candie Arias  There are scattered fibroglandular densities.   Finding 1: Follow-up calcifications in the left breast does not persist.   Finding 2: Follow-up isodense, oval mass measuring 22 millimeters with circumscribed margins in the lower inner region of the left breast has decreased in size.   Finding 3: Multiple stable punctate and spherical lucent-centered calcifications seen in both breasts.   IMPRESSION:  Finding 1: Benign-Negative  Finding 2: Benign-Negative   Finding 3: Benign-Negative   BI-RADS Category 2: benign     In the interim, Mrs. Arias has done well. She has noted no changes in her breast exam- no new masses, skin changes, niplpechanges, nipple discharge either breast.  She has gained 17# and weighs 215 today.    SHe is having knee trouble.  She continues the tamoxifen and sees Dr Sexton for followup.  Her most recent imaging includes 3-2014 MRI at [facility] and mammogram at [facility] that are both BR2, see below.    Mrs. Arias's MRI 3-27-15 showed  LEFT breast 3:00 post op architectural change.  RIGHT breast showed anterior third 11:30 linear enhancement, borderline clumped, 6.4 cm AP x 2.8 cm sup to inf x 1.4 cm med to lat- rec biopsy, stereo if mammo correlate  No Right adenopathy.   On mammogram at [facility] there are new calcifications linearly distributed middle third UOQ, rec additional imaging.  Additional views showed 7-8 cm in the AP dimension of indeterminate calcifications that correlated with the MRI findings. Rec stereo    Stereo returned as DCIS, high grade, with comedo necrosis, at least 3mm, with calcifications.     She is here to rview and discuss treatment.    Recently, she has had the following occurrences:  1- LEFT knee replacement Dr Barney- postop infection, replacement of prosthesis and home antibiotics- either rocephin or vanco and she had a bad reaction. Dr Barney has since retired and  she will be seeing Dr. He.  2- Seen in 2013 for tachycardia by cardiology. Perioperatively had persistnet tachycardia per her report.  Saw Dr Moreira for this and he had her get a cardiac HENRY that was negative per her report.    Her review of systems is unchanged other than  the above since 4-4-14.  I have reviewed the patient's Past Medical History, Family History, Social History, medications and allergies and confirm that the information is up to date and accurate.      10-22-14- Saw a NP in Dr Roberts office and Dx with atrial bigenimy and tachycardia. Her free T4 was low at 0.76 and they rec FU with Dr Moreira  They then recommended Holter monitor. Normal 2D echo noted in 7-2010 after her dx atrial bigeminy.  Did not feel that chest pressure was characteristic to merit a stress test.    Dr Sexton called and stated that she did not need any additional prophylaxis for the MTHFR mutation    FHcorrected- her neice who had breast cancer had carcinoid not ovarian cancer.    - Called Dr. He office- she had a drug induced fever, related to Rocephin not vanocmycin.    Dr Shearer stated:  stress scho with normal EF 60%, o/w normal, hx tachycardia and atrial bigenimy that is stable. She is low risk and since echo is normal can proceed.    Her genetics pandel returned as negative.    We went to the operating room on 5-20-15 for bilateral mastectomy and RIGHT SLNB returned as 3.5+ cm of multifocal DCIS with pagets of the nipple. DCIS present in UOQ, UIQ and LIQ.  High grade, comedonecrosis, and margins negative, 0/3 nodes, LEFT breast CCC, usual hyperplasia, and sclerosing adenosis. Margins clear closest 1.0 cm.   Stage [id number]- stage 0    In the interim,  Candie Arias  has done well.  She has noted no changes in her breast exam. No new masses, skin changes, skin discoloration on either reconstructed breast skin.  She denies headache, bone pain, belly pain, cough, changes in vision or gait.  Her most recent  imaging includes the following: None    She has lost 4 pounds.    She has stopped the tamoxifen as of June 16, 2015.        In the interim,  Candie Arias  has done well.  She has noted no changes in her reconstructed breast exam. No new masses, skin changes, either breast.   She denies headache, bone pain, belly pain, cough, changes in vision or gait.    She moved to [place] lymph there a year and moved back.  She did not see survivorship or nutrition.  Her weight is stable.  Her left knee replacement has had scarring and has demonstrated decreased mobility.    Interval History:    In the interim,  Candie Arias  has done well.  She has noted no changes in her reconstructed breast exam. No new masses, skin changes, either breast.   She denies headache, bone pain, belly pain, cough, changes in vision or gait.    She has gained 11 pounds in the interim.  In the interim she did go to see our nutritionist and she said that it wasn't useful but she just has a hard time following a diet.  She is having trouble with her left knee.        Review of Systems:  Review of Systems   Constitutional: Positive for unexpected weight change (11 lb wt gain).   All other systems reviewed and are negative.       Past Medical and Surgical History:  Breast Biopsy History:  Patient has had the following breast biopsies:Patient has had  2 number of breast biopsies in the past.  .   She had one on the  left  side in year 5/12, as per HPI  She had one on the  right  side in year  01/10. The pathology from this biopsy was  benign per her report.  The patient has never had a breast operation    Breast Cancer HIstory:  Patient does not have a past medical history of breast cancer.  Breast Operations, and year:  NONE  Obstetric/Gynecologic History:  Age menstrual periods began: 12  Patient is postmenopausal, entered menopause naturally at age: The date of her last menstrual period was  2007 at the time or Mirena IUD placement.  The  "patient started having perimenopausal symptoms in terms of hot flashes and mood lability since 2011 summer.   Number of pregnancies: 2  Number of live births: 1  Number of abortions or miscarriages: 1  Age of delivery of first child: 24  Patient did not breast feed.  Length of time taking birth control pills:30 YRS   Patient has never taken hormone replacement    Past Surgical History:   Procedure Laterality Date   • APPENDECTOMY  1972   • JOINT REPLACEMENT     • LAPAROSCOPIC CHOLECYSTECTOMY  2002   • MASTECTOMY     • TOTAL KNEE ARTHROPLASTY         Past Medical History:   Diagnosis Date   • Arthritis    • Back pain    • Benign essential hypertension    • Cancer    • Hypertension    • Insomnia related to another mental disorder     ARTHRITIS   • Menopause    • Obesity        Prior Hospitalizations, other than for surgery or childbirth, and year:  NONE     Social History     Social History   • Marital status:      Spouse name: N/A   • Number of children: N/A   • Years of education: N/A     Occupational History   • Not on file.     Social History Main Topics   • Smoking status: Never Smoker   • Smokeless tobacco: Never Used   • Alcohol use Yes      Comment: MODERATE    • Drug use: No   • Sexual activity: Not on file     Other Topics Concern   • Not on file     Social History Narrative   • No narrative on file     Patient is .  Patient is employed full time with the following occupation: CLERICAL  Patient drinks 1 servings of caffeine per day.    Family History:  Family History   Problem Relation Age of Onset   • Cancer Father    • Heart failure Mother    • Diabetes Other    • Breast cancer Other 38     SISTER'S DAUGHTER   • Testicular cancer Other      20S-SISTER'S SON        Vital Signs:  /76   Ht 167.6 cm (66\")   Wt 99.8 kg (220 lb)   BMI 35.51 kg/m²      Medications:    Current Outpatient Prescriptions:   •  Acetaminophen (TYLENOL) 325 MG capsule, Take  by mouth., Disp: , Rfl:   •  " lisinopril-hydrochlorothiazide (PRINZIDE,ZESTORETIC) 20-12.5 MG per tablet, Take 1 tablet by mouth Daily., Disp: 90 tablet, Rfl: 1  •  Loratadine 10 MG capsule, Take  by mouth., Disp: , Rfl:   •  nabumetone (RELAFEN) 500 MG tablet, , Disp: , Rfl:   •  celecoxib (CeleBREX) 200 MG capsule, , Disp: , Rfl:   •  diclofenac (VOLTAREN) 1 % gel gel, , Disp: , Rfl:   •  Triprolidine-Pseudoephedrine (ANTIHISTAMINE PO), Take by mouth., Disp: , Rfl:      Allergies:  Allergies   Allergen Reactions   • Rocephin [Ceftriaxone] Other (See Comments)     FEVER  INDUCED FEVER       Physical Examination:  General Appearance:  Patient is in no distress.  She is well kept and has an obese build.   Psychiatric:  Patient with appropriate mood and affect. Alert and oriented to self, time, and place.    Breast, RIGHT: Surgically absent with implant in place.  There is a well healed vertical incision starting at the mid intramammary crease.  No nodules or discolorations on the breast skin.    Breast, LEFT:  Surgically absent with implant in place.  There is a well healed vertical incision starting at the mid intramammary crease.  No nodules or discolorations on the breast skin.    Lymphatic:  There is no axillary, cervical, infraclavicular, or supraclavicular adenopathy bilaterally.  Eyes:  Pupils are round and reactive to light.  Cardiovascular:  Heart rate and rhythm are regular.  Respiratory:  Lungs are clear bilaterally with no crackles or wheezes in any lung field.  Gastrointestinal:  Abdomen is soft, nondistended, and nontender.  She has a vertical right lower quadrant incision from a remote appendectomy.  She has trocar incisions from her laparoscopic cholecystectomy.    Musculoskeletal:  Good strength in all 4 extremities.   There is good range of motion in both shoulders.    Skin:  No new skin lesions or rashes on the skin excluding the breast (see breast exam above).        Imagin12 Screening mammogram [facility]  [facility]  There are scattered fibroglandular densities.   Finding 1: There are several punctate and fine granular calcifications with grouped distribution seen in the middle one-third upper outer region of the left breast. There is an increased number of calcifications.   Finding 2: Stable isodense, oval mass measuring 11 millimeters lower inner region of the left breast.   Finding 3: Stable isodense, oval mass measuring 11 millimeteres upper outer region of the right breast. Note is made of a biopsy clip as evidence of a previous surgical procedure.   Finding 4: Punctate and spherical calcifications seen in both breasts.   Impression:   Finding 1: Calcifications in the left breast require additional evaluation.   Finding 2: Benign-Negative.   Finding 3: Benign-Negative.   Finding 4: Benign-Negative.   BI-RADS Category 0    04-10-12 Left breast mammography [facility]  Impression:   Calcifications in the left breats are suspicious, stereotactic biopsy is recommended.   BIRADS Category 4B    05-17-12 Bilateral breast MRI [facility]  Within the posterior one-third upper outer quadrant left breast 2:30 o'clock position, there is a metallic clip denoting site of ADH/ ALH.   Within the upper-outer quadrant of the right breast at the 9:30 o'clock position 8.3 cm from the nipple. There is linear enhancement that extends over 1.3 cm in anterior posterior dimension. No mammographic correlate is appreciated.   Impression:   1.3 cm linear irregular enhancement 9:30 right breast 8 cm from the nipple. Correlation with an MR guided right breast biopsy is recommended.   Biopsy proven site of ADH and or ALH posterior one third upper outer quadrant of the left breast without any suspicious surounding enhancement.   BIRADS Category 4    03/22/13       Bilateral Diagnostic Mammogram       [facility]  There are scattered fibroglandular densities.   Finding 1: Follow-up calcifications in the left  breast does not persist.   Finding 2: Follow-up isodense, oval mass measuring 22 millimeters with circumscribed margins in the lower inner region of the left breast has decreased in size.   Finding 3: Multiple stable punctate and spherical lucent-centered calcifications seen in both breasts.   IMPRESSION:  Finding 1: Benign-Negative  Finding 2: Benign-Negative   Finding 3: Benign-Negative   BI-RADS Category 2: benign     03/26/2014      [facility]      Bilateral Breast MRI      Hugo,Candie  Posterior one-third left breast lateral to the plane of the nipple,there is a 2.2 cm area of fat necrosis. A 0.7 cm oil cyst is also noted within the left breast.  There are no findings suspicious for malignancy in either breast.  Birads Category 2:Benign    03/26/14        [facility]         Bilateral Screening Mammogram with Tomosynthesis       Candie Hugo  There are scattered fibroglandular densities.   Finding 1: Stable isodense, oval mass measuring 8 millimeters lower inner region of the left breast.   Finding 2: Multiple stable calcifications seen in both breasts.   Finding 3: Stable post surgical scar posterior one third upper outer region of the left breast.   IMPRESSION:  Finding 1: Benign Negative   Finding 2: Benign Negative  Finding 3: Benign Negative   BIRADS category 2: benign     03-27-15     [facility]    Bilateral Screen Mammogram w Tomosynthesis   Hugo, Candie    scattered fibroglandular densities  Finding 1:  There are new fine pleomorphic calcifications with Linear distribution seen in the  middle one third upper outer region of the right breast.  the new calcifications are flanked by the biopsy  clips in the right upper outer quadrant.    Finding 2:  isodense, oval mass measuring 8 millimeters with circumscribed margins seen in the lower  inner region of the left breast.    This finding is most consistent with a cyst.    Finding 3:  stable post-surgical scar seen in the upper outer region of the  left breast.  in a area of prior excisional biopsy.    Impression:    Finding 1:  Breast require additional evaluation, spot compression magnification views are recommended.  Finding 2:  Benign Negative  Finding 3:  Benign Negative    Birads Category 0    03-27-15     [facility]     Bilateral Breast MRI               Hugo, Candie  posterior one third lateral left breast 3 o'clock 10 cm from the nipple,  post surgical architectural distortion with a 1.8 cm oil cyst.  Within the right breast anterior one third centered 11:30 region of linear, borderline clumped, branching enhancement  that measures on the order of 6.4 cm in anterior to posterior dimension, 2.8 cm in the superior to inferior dimension and 1.4 cm in the medial to lateral dimension.  A mammographic  examination is not available for comparison.  I see no evidence for abnormal right breast skin, nipple, or chest wall enhancement and there  is no evidence for right axillary adenopathy.  Impression:  The imaging features are suspicious for the presence of atypia and/or DCIS.   Ultimately,  percutaneous biopsy of the region is recommended and can be performed  under MRI guidance,  however, if thre are calcifications seen in the region a stereotactic guided biopsy  can be performed.  There are no findings suspicious for malignancy in the left breast.  Birads Category 4 Suspicious      04/01/15           [facility]            RIGHT DIGITAL DIAG MAMMOGRAM WITH TOMOSYNTHESIS              Candie Hugo  Additional evaluation calcifications in the right breast, upper outer. there are multiple fine pleomorphic calcification with linear distraction in the middle one-third upper outer region of the right breast. There is an increased number of calcifications. Tissue sampling is recommended.   IMPRESSION:   BI-RADS Category 4C    Pathology:  05-03-12 Stereotactic biopsy [facility]  12 core needle biopsy specimens. 9 gauge vacuum assisted device. Marker clip was deployed.     05-03-12  "Pathology results [facility]  Diagnosis:   Left breast upper outer quadrant.  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   Atypical lobular hyperplasia (ALH) is also noted.   Surounding breast parenchyma demonstrates pseudoangiomatous stromal hyperplasia, columnar cell hyperplasia, apocrine change, and fibrocystic changes.       6-6-12- OPER:  RIGHT BREAST MRI-GUIDED BIOPSY   Final Diagnosis  \"RIGHT BREAST LINEAR ENHANCEMENT AT 9:30\", CORE BIOPSIES:       FOCAL COLUMNAR CELL CHANGE WITHOUT ATYPIA.       MILD FIBROCYSTIC CHANGES WITH SCLEROSING ADENOSIS, DUCT ECTASIA.      7-5-12-  LEFT lumpectomy   Final Diagnosis  LEFT BREAST LUMPECTOMY:       BREAST TISSUE WITH FOCAL ATYPICAL DUCT HYPERPLASIA AT SITE OF PRIOR BIOPSY.       FOCI OF ATYPICAL LOBULAR HYPERPLASIA.       ADDITIONAL FIBROCYSTIC CHANGES INCLUDING APOCRINE METAPLASIA, COLUMNAR             CELL CHANGE, ADENOSIS AND MICROCYST FORMATION.    COMMENT: Margins free of atypical duct hyperplasia.  Atypical duct hyperplasia  is present on slide 1H.      04/02/15                 [facility]       Stereotactic Biopsy                Candie [place]  right breast  12 core needle biopsy specimens were obtained using a 9 gauge vacuum assisted device. Top hat shaped s-jorge l eviva tissue marker was deployed within the biopsy bed.   ADDENDUM  The pathology report from [facility] was received on 4/3/2015, and the initial report demonstrated benign pathology. This was discordant. Jessica Potter did additional cuts and the study demonstrated ductal carcinoma in situ (DCIS), high grade with comedo necrosis. This is concordant.   Please note that the calcifications extend from the AP dimension approximately 7 cm. This roughly correlates to the recent findings on the MRI. These Calcifications are also located between two previously " biopsy clip markers. Stereotactic biopsy that I performed is in the center of these two and is a top hat shaped marker.     04/02/15             [facility]                   Pathology Report                  Candie Arias DESTINEE   DIAGNOSIS  Right breast upper outer quadrant, stereotactic bore biopsies:  ductal carcinoma in situ (DCIS), high grade with comedo Necrosis , at least 0.3 cm in dimension with determinate microcalcifications.   ORIGINAL DIAGNOSIS:  Right breast upper outer quadrant, stereotactic core biopsies:   Benign breast parenchyma with areas of mild hyalinize stromal change and occasional microcalcifications.   Negative for atypia or malignancy.       Received: 5/20/2015  Reported: 5/21/2015    Clinical Diagnosis and History       Right breast cancer/DCIS       Operation: Bilateral total breast mastectomy with right axilla sentinel  node biopsy - frozen section       Diagnosis  1: SENTINEL LYMPH NODE #1, RIGHT AXILLA, EXCISION:       ONE LYMPH NODE, NEGATIVE FOR METASTATIC CARCINOMA.     2: SENTINEL LYMPH NODE #2, RIGHT AXILLA, EXCISION:       ONE LYMPH NODE, NEGATIVE FOR METASTATIC CARCINOMA.     3: SENTINEL LYMPH NODE #3, RIGHT AXILLA, EXCISION:       ONE LYMPH NODE, NEGATIVE FOR METASTATIC CARCINOMA.     4: BREAST, RIGHT, MASTECTOMY:       MULTIFOCAL DUCTAL CARCINOMA IN SITU (DCIS).        FOCAL PAGET'S DISEASE OF NIPPLE (CONFIRMED WITH IMMUNOHISTOCHEMICAL      STAIN FOR CYTOKERATIN 7).        NO DEFINITE INVASION IS SEEN IN SECTIONS EXAMINED.        SEE SYNOPTIC REPORT (BELOW) FOR ADDITIONAL DETAILS.     5: BREAST, LEFT, MASTECTOMY:       EXTENSIVE FIBROSIS IN UPPER OUTER QUADRANT, SUGGESTIVE OF SITE OF PRIOR       LUMPECTOMY ( JULY 2012).       FIBROCYSTIC CHANGES WITH DUCT DILATATION AND STASIS, MICROCYST FORMATION,      AND APOCRINE METAPLASIA.        COLUMNAR CELL CHANGE WITHOUT ATYPIA.        FOCAL DUCTAL HYPERPLASIA OF THE USUAL TYPE.        FOCAL DUCT/CYST RUPTURE WITH ASSOCIATED FOREIGN BODY-TYPE  GIANT CELL      REACTION.        FOCAL SCLEROSING ADENOSIS.       UNREMARKABLE NIPPLE.        NO EVIDENCE OF MALIGNANCY.       SYNOPTIC REPORT (BASED ON CAP CANCER CASE SUMMARY, version December 2013):       Procedure: Total mastectomy (including nipple and skin).       Lymph node sampling: [place] lymph nodes.        Specimen laterality: Right.        Tumor site: Multiple foci of DCIS in upper outer quadrant, mid to upper  inner quadrant, and focally            in lower inner quadrant.        Size (extent) of DCIS: Approximately 3.5 cm in mid superior breast and  involving upper inner and      upper outer quadrants (estimate based on gross evaluation).        Histologic type: Ductal carcinoma in situ.             Architectural patterns: Solid and comedo.             Nuclear grade: Grade III (high).             Necrosis: Present, central comedonecrosis.       Macroscopic and microscopic extent of tumor:            Skin: Not applicable.            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            Skeletal muscle: Not applicable.        Margins: Uninvolved by DCIS.             Distance from closest margin: Greater than 10 mm in sections examined  (superficial and           deep margins).        Treatment effect (response to presurgical neoadjuvant therapy): No known  presurgical       therapy.        Lymph nodes:            Total number of lymph nodes examined (sentinel and nonsentinel): 3.            Number of sentinel lymph nodes examined: 3.            Number of lymph nodes with macrometastases: 0.            Number of lymph nodes with micrometastases: 0.            Number of lymph nodes with isolated tumor cells: 0.            Method of evaluation of sentinel lymph nodes: H&E, multiple levels  (confirmatory           immunohistochemical stains are available upon request).        Pathologic staging:             Primary tumor: pTis (DCIS).             Regional lymph nodes: pN0(sn).             Distant metastasis: Not applicable.        Additional pathologic findings in Right Breast:        Fibrocystic changes with duct dilatation and stasis and apocrine  metaplasia.             Focal sclerosing adenosis.            Columnar cell change without atypia.             Ductal hyperplasia of the usual type.             At least two separate biopsy sites identified.         Ancillary studies: ER, IA, and Ki-67 studies performed on previous biopsy  specimen at outside      facility ([facility]).        Microcalcifications: Present in both DCIS and nonneoplastic tissue.       TDJ/hmf IHC/a/CMK      Procedures:      Assessment:   Diagnosis Plan   1. Ductal carcinoma in situ (DCIS) of right breast     2. Atypical lobular hyperplasia (ALH) of breast     3. Atypical ductal hyperplasia of breast     4. FH: breast cancer       1-  RIGHT breast 6.4 cm span enhancement on MRI AP- 11:30 anterior third; associated 7 -8 cm calcifications on mammogram  Clinical stage TisN0- stage 0    5-20-15 bilateral mastectomy and RIGHT SLNB returned as 3.5+ cm of multifocal DCIS with pagets of the nipple. DCIS present in UOQ, UIQ and LIQ.  High grade, comedonecrosis, and margins negative, 0/3 nodes, LEFT breast CCC, usual hyperplasia, and sclerosing adenosis. Margins clear closest 1.0 cm.   Stage [id number]- stage 0  - recosntruction with expanders, mil, Dr Waterhouse  - did not need XRT based on clear margins        2-3  -LEFT breast ADH-borderline DCIS and LEFT breast ALH on core biopsy  - both located at site of mammographic calcificaitons, 1.6 cm AP on mammo, middle 1/3 OUQ  - 7-2012 excision atypical hyerplasia- residual ADH and ALH noted  - path review at [facility] on cores suggest no DCIS present on initial cores  - Her sister has a protein S deficiency and hypercoagulable state with prior pulmonary emboli-  CBC found to have mutation in the MTHFR gene (clotting) , but that this is insignificant for perioperative considerations  . Not on antothombotic agents  -  tamoxifen 7-18-12 -Dr. Sexton - stopped 6-16-15 after mastectomies  - seen by Dr. Mederos- did not need radiation  -11-4-15 implant placement Dr Waterhouse    4  maternal niece age 38- she was tested and BRCA negative  Patient: 04/24/15                  Gene Dx             Candie Arias  Comprehensive Cancer Panel   Results Summary:  NEGATIVE     Plan:  Candie Arias is doing well today at her 3 year visit. We reviewed her interval history, examination and symptoms. There is no evidence of disease today.    We previously discussed the nature of the survivorship initiative and she elected not to pursue this.  We have discussed several times maintaining a healthy weight and the importance on recurrence risk.   She has seen our nutritionist.  I will see her back in 1 year.  We reviewed her surveillance together today.  She will not need any imaging.  So we'll see her back in May 2019.      I asked her to continue her self breast exam and to call in the interim with concerns or changes.      Ana Duque MD        Next Appointment:  Return in about 1 year (around 5/10/2019) for no imaging.

## 2018-05-11 ENCOUNTER — TELEPHONE (OUTPATIENT)
Dept: SURGERY | Facility: CLINIC | Age: 58
End: 2018-05-11

## 2018-06-28 ENCOUNTER — LAB (OUTPATIENT)
Dept: LAB | Facility: HOSPITAL | Age: 58
End: 2018-06-28
Attending: ORTHOPAEDIC SURGERY

## 2018-06-28 ENCOUNTER — TRANSCRIBE ORDERS (OUTPATIENT)
Dept: ADMINISTRATIVE | Facility: HOSPITAL | Age: 58
End: 2018-06-28

## 2018-06-28 DIAGNOSIS — M25.569 KNEE PAIN, UNSPECIFIED CHRONICITY, UNSPECIFIED LATERALITY: ICD-10-CM

## 2018-06-28 DIAGNOSIS — M25.569 KNEE PAIN, UNSPECIFIED CHRONICITY, UNSPECIFIED LATERALITY: Primary | ICD-10-CM

## 2018-06-28 LAB
CRP SERPL-MCNC: 0.5 MG/DL (ref 0–0.5)
ERYTHROCYTE [SEDIMENTATION RATE] IN BLOOD: 2 MM/HR (ref 0–30)

## 2018-06-28 PROCEDURE — 36415 COLL VENOUS BLD VENIPUNCTURE: CPT

## 2018-06-28 PROCEDURE — 86140 C-REACTIVE PROTEIN: CPT

## 2018-06-28 PROCEDURE — 85652 RBC SED RATE AUTOMATED: CPT

## 2018-08-20 ENCOUNTER — LAB (OUTPATIENT)
Dept: LAB | Facility: HOSPITAL | Age: 58
End: 2018-08-20
Attending: ORTHOPAEDIC SURGERY

## 2018-08-20 ENCOUNTER — HOSPITAL ENCOUNTER (OUTPATIENT)
Dept: GENERAL RADIOLOGY | Facility: HOSPITAL | Age: 58
Discharge: HOME | End: 2018-08-20
Attending: ORTHOPAEDIC SURGERY | Admitting: ORTHOPAEDIC SURGERY

## 2018-08-20 ENCOUNTER — HOSPITAL ENCOUNTER (OUTPATIENT)
Dept: CARDIOLOGY | Facility: HOSPITAL | Age: 58
Discharge: HOME | End: 2018-08-20
Attending: ORTHOPAEDIC SURGERY

## 2018-08-20 ENCOUNTER — TRANSCRIBE ORDERS (OUTPATIENT)
Dept: LAB | Facility: HOSPITAL | Age: 58
End: 2018-08-20

## 2018-08-20 DIAGNOSIS — I10 HYPERTENSION, UNSPECIFIED TYPE: Primary | ICD-10-CM

## 2018-08-20 DIAGNOSIS — Z01.811 PRE-OP CHEST EXAM: ICD-10-CM

## 2018-08-20 DIAGNOSIS — I10 HYPERTENSION, UNSPECIFIED TYPE: ICD-10-CM

## 2018-08-20 LAB
ALBUMIN SERPL-MCNC: 4.6 G/DL (ref 3.5–5.2)
ALBUMIN/GLOB SERPL: 1.9 G/DL
ALP SERPL-CCNC: 68 U/L (ref 39–117)
ALT SERPL W P-5'-P-CCNC: 17 U/L (ref 1–33)
ANION GAP SERPL CALCULATED.3IONS-SCNC: 15.4 MMOL/L
AST SERPL-CCNC: 12 U/L (ref 1–32)
BACTERIA UR QL AUTO: ABNORMAL /HPF
BILIRUB SERPL-MCNC: 0.4 MG/DL (ref 0.1–1.2)
BILIRUB UR QL STRIP: NEGATIVE
BUN BLD-MCNC: 20 MG/DL (ref 6–20)
BUN/CREAT SERPL: 26 (ref 7–25)
CALCIUM SPEC-SCNC: 9.2 MG/DL (ref 8.6–10.5)
CHLORIDE SERPL-SCNC: 99 MMOL/L (ref 98–107)
CLARITY UR: CLEAR
CO2 SERPL-SCNC: 24.6 MMOL/L (ref 22–29)
COLOR UR: YELLOW
CREAT BLD-MCNC: 0.77 MG/DL (ref 0.57–1)
DEPRECATED RDW RBC AUTO: 44 FL (ref 37–54)
ERYTHROCYTE [DISTWIDTH] IN BLOOD BY AUTOMATED COUNT: 13.3 % (ref 11.7–13)
GFR SERPL CREATININE-BSD FRML MDRD: 77 ML/MIN/1.73
GLOBULIN UR ELPH-MCNC: 2.4 GM/DL
GLUCOSE BLD-MCNC: 98 MG/DL (ref 65–99)
GLUCOSE UR STRIP-MCNC: NEGATIVE MG/DL
HCT VFR BLD AUTO: 45.6 % (ref 35.6–45.5)
HGB BLD-MCNC: 14.8 G/DL (ref 11.9–15.5)
HGB UR QL STRIP.AUTO: NEGATIVE
HYALINE CASTS UR QL AUTO: ABNORMAL /LPF
KETONES UR QL STRIP: NEGATIVE
LEUKOCYTE ESTERASE UR QL STRIP.AUTO: ABNORMAL
MCH RBC QN AUTO: 29 PG (ref 26.9–32)
MCHC RBC AUTO-ENTMCNC: 32.5 G/DL (ref 32.4–36.3)
MCV RBC AUTO: 89.2 FL (ref 80.5–98.2)
NITRITE UR QL STRIP: NEGATIVE
PH UR STRIP.AUTO: 5.5 [PH] (ref 5–8)
PLATELET # BLD AUTO: 307 10*3/MM3 (ref 140–500)
PMV BLD AUTO: 9.7 FL (ref 6–12)
POTASSIUM BLD-SCNC: 4.5 MMOL/L (ref 3.5–5.2)
PROT SERPL-MCNC: 7 G/DL (ref 6–8.5)
PROT UR QL STRIP: NEGATIVE
RBC # BLD AUTO: 5.11 10*6/MM3 (ref 3.9–5.2)
RBC # UR: ABNORMAL /HPF
REF LAB TEST METHOD: ABNORMAL
SODIUM BLD-SCNC: 139 MMOL/L (ref 136–145)
SP GR UR STRIP: >=1.03 (ref 1–1.03)
SQUAMOUS #/AREA URNS HPF: ABNORMAL /HPF
UROBILINOGEN UR QL STRIP: ABNORMAL
WBC NRBC COR # BLD: 7.64 10*3/MM3 (ref 4.5–10.7)
WBC UR QL AUTO: ABNORMAL /HPF

## 2018-08-20 PROCEDURE — 81001 URINALYSIS AUTO W/SCOPE: CPT

## 2018-08-20 PROCEDURE — 93010 ELECTROCARDIOGRAM REPORT: CPT | Performed by: INTERNAL MEDICINE

## 2018-08-20 PROCEDURE — 71046 X-RAY EXAM CHEST 2 VIEWS: CPT

## 2018-08-20 PROCEDURE — 85027 COMPLETE CBC AUTOMATED: CPT

## 2018-08-20 PROCEDURE — 36415 COLL VENOUS BLD VENIPUNCTURE: CPT

## 2018-08-20 PROCEDURE — 93005 ELECTROCARDIOGRAM TRACING: CPT | Performed by: ORTHOPAEDIC SURGERY

## 2018-08-20 PROCEDURE — 80053 COMPREHEN METABOLIC PANEL: CPT

## 2018-08-24 RX ORDER — LISINOPRIL AND HYDROCHLOROTHIAZIDE 20; 12.5 MG/1; MG/1
TABLET ORAL
Qty: 30 TABLET | Refills: 0 | Status: SHIPPED | OUTPATIENT
Start: 2018-08-24 | End: 2018-09-26 | Stop reason: SDUPTHER

## 2018-08-24 NOTE — TELEPHONE ENCOUNTER
rx request for lisinopril-hydrochlorothiazide (PRINZIDE,ZESTORETIC) 20-12.5 MG per tablet      last seen 2/12/18

## 2018-09-26 RX ORDER — LISINOPRIL AND HYDROCHLOROTHIAZIDE 20; 12.5 MG/1; MG/1
TABLET ORAL
Qty: 30 TABLET | Refills: 5 | Status: SHIPPED | OUTPATIENT
Start: 2018-09-26 | End: 2019-03-26 | Stop reason: SDUPTHER

## 2019-01-09 ENCOUNTER — OFFICE VISIT (OUTPATIENT)
Dept: FAMILY MEDICINE CLINIC | Facility: CLINIC | Age: 59
End: 2019-01-09

## 2019-01-09 VITALS
SYSTOLIC BLOOD PRESSURE: 114 MMHG | HEART RATE: 118 BPM | BODY MASS INDEX: 36 KG/M2 | HEIGHT: 66 IN | WEIGHT: 224 LBS | DIASTOLIC BLOOD PRESSURE: 70 MMHG | OXYGEN SATURATION: 96 %

## 2019-01-09 DIAGNOSIS — J20.9 ACUTE BRONCHITIS, UNSPECIFIED ORGANISM: Primary | ICD-10-CM

## 2019-01-09 PROCEDURE — 99214 OFFICE O/P EST MOD 30 MIN: CPT | Performed by: NURSE PRACTITIONER

## 2019-01-09 RX ORDER — DEXTROMETHORPHAN HYDROBROMIDE AND PROMETHAZINE HYDROCHLORIDE 15; 6.25 MG/5ML; MG/5ML
5 SYRUP ORAL 4 TIMES DAILY PRN
Qty: 120 ML | Refills: 1 | Status: SHIPPED | OUTPATIENT
Start: 2019-01-09 | End: 2019-05-16

## 2019-01-09 RX ORDER — AZITHROMYCIN 250 MG/1
TABLET, FILM COATED ORAL
Qty: 6 TABLET | Refills: 0 | Status: SHIPPED | OUTPATIENT
Start: 2019-01-09 | End: 2019-05-16

## 2019-01-09 NOTE — PATIENT INSTRUCTIONS
Discharge instructions  You have viral bronchitis  No need for antibiotics  Symptomatic treatment  Cough medicine at night as needed  Push fluids  Plenty of rest  If more productive cough medicine may help thin    If fever  Worsening symptoms  Start antibiotic Z-Osbaldo    However if high fever  Moderate or severe worsening  Shortness of breath chest pain increasing fatigue  Urgent care ER for chest x-ray and reevaluation of    Acute Bronchitis, Adult  Acute bronchitis is when air tubes (bronchi) in the lungs suddenly get swollen. The condition can make it hard to breathe. It can also cause these symptoms:  · A cough.  · Coughing up clear, yellow, or green mucus.  · Wheezing.  · Chest congestion.  · Shortness of breath.  · A fever.  · Body aches.  · Chills.  · A sore throat.    Follow these instructions at home:  Medicines  · Take over-the-counter and prescription medicines only as told by your doctor.  · If you were prescribed an antibiotic medicine, take it as told by your doctor. Do not stop taking the antibiotic even if you start to feel better.  General instructions  · Rest.  · Drink enough fluids to keep your pee (urine) clear or pale yellow.  · Avoid smoking and secondhand smoke. If you smoke and you need help quitting, ask your doctor. Quitting will help your lungs heal faster.  · Use an inhaler, cool mist vaporizer, or humidifier as told by your doctor.  · Keep all follow-up visits as told by your doctor. This is important.  How is this prevented?  To lower your risk of getting this condition again:  · Wash your hands often with soap and water. If you cannot use soap and water, use hand .  · Avoid contact with people who have cold symptoms.  · Try not to touch your hands to your mouth, nose, or eyes.  · Make sure to get the flu shot every year.    Contact a doctor if:  · Your symptoms do not get better in 2 weeks.  Get help right away if:  · You cough up blood.  · You have chest pain.  · You have very  bad shortness of breath.  · You become dehydrated.  · You faint (pass out) or keep feeling like you are going to pass out.  · You keep throwing up (vomiting).  · You have a very bad headache.  · Your fever or chills gets worse.  This information is not intended to replace advice given to you by your health care provider. Make sure you discuss any questions you have with your health care provider.  Document Released: 06/05/2009 Document Revised: 07/26/2017 Document Reviewed: 06/07/2017  [employer] Interactive Patient Education © 2018 [employer]

## 2019-01-09 NOTE — PROGRESS NOTES
Subjective   Candie Arias is a 58 y.o. female.     COUGH  6 days  Mostly at night  Ear fluid?  Plugged feeling  No fever no chills  No soa      pmh  No lung  No tob  Never  Pneumonia        Cough     Fatigue   Associated symptoms include coughing and fatigue.        The following portions of the patient's history were reviewed and updated as appropriate: allergies, current medications, past family history, past medical history, past social history, past surgical history and problem list.    Review of Systems   Constitutional: Positive for fatigue.   Respiratory: Positive for cough.        Objective   Physical Exam   Constitutional: She is oriented to person, place, and time. She appears well-developed and well-nourished. No distress.   HENT:   Head: Normocephalic.   Mouth/Throat: Oropharynx is clear and moist.   Turbinates congested   Eyes: Conjunctivae are normal. Pupils are equal, round, and reactive to light.   Neck: Neck supple.   Cardiovascular: Normal rate, regular rhythm and normal heart sounds. Exam reveals no friction rub.   No murmur heard.  Pulmonary/Chest: Effort normal and breath sounds normal. No respiratory distress. She has no wheezes.   Musculoskeletal: She exhibits no edema.   Neurological: She is alert and oriented to person, place, and time.   Skin: Skin is warm and dry. She is not diaphoretic.   Psychiatric: She has a normal mood and affect. Her behavior is normal. Judgment and thought content normal.   Vitals reviewed.        Assessment/Plan   Candie was seen today for cough and fatigue.    Diagnoses and all orders for this visit:    Acute bronchitis, unspecified organism  Comments:  Viral    Other orders  -     promethazine-dextromethorphan (PROMETHAZINE-DM) 6.25-15 MG/5ML syrup; Take 5 mL by mouth 4 (Four) Times a Day As Needed for Cough. Caution sedation falls  -     azithromycin (ZITHROMAX Z-EMERSON) 250 MG tablet; Take 2 tablets the first day, then 1 tablet daily for 4 days.               Discharge instructions  You have viral bronchitis  No need for antibiotics  Symptomatic treatment  Cough medicine at night as needed  Push fluids  Plenty of rest  If more productive cough medicine may help thin    If fever  Worsening symptoms  Start antibiotic Z-Osbaldo    However if high fever  Moderate or severe worsening  Shortness of breath chest pain increasing fatigue  Urgent care ER for chest x-ray and reevaluation of

## 2019-01-17 ENCOUNTER — TELEPHONE (OUTPATIENT)
Dept: FAMILY MEDICINE CLINIC | Facility: CLINIC | Age: 59
End: 2019-01-17

## 2019-01-17 NOTE — TELEPHONE ENCOUNTER
Clogged ear feeling  Most of the time is related to eustachian tube dysfunction due to sinus congestion    If she has increased pain fever  Urgent care    Flonase OTC  Twice daily until gone  May take 1-3 weeks to clear up  If not return office for an evaluation or urgent care      Most effective medication as Sudafed but we cannot take it with high blood pressure  So it will take a while

## 2019-01-17 NOTE — TELEPHONE ENCOUNTER
PATIENT WAS SEEN ON 19/18 FOR VIRAL BRONCHITIS. SHE IS FEELING BETTER BUT, NOW C/O OF CLOGGED EARS. SHE WOULD LIKE  TO KNOW IF THE ANTIBIOTIC WILL HELP FOR HER CLOGGED EARS.    ADVISE.

## 2019-03-27 RX ORDER — LISINOPRIL AND HYDROCHLOROTHIAZIDE 20; 12.5 MG/1; MG/1
TABLET ORAL
Qty: 30 TABLET | Refills: 1 | Status: SHIPPED | OUTPATIENT
Start: 2019-03-27 | End: 2019-05-27 | Stop reason: SDUPTHER

## 2019-05-16 ENCOUNTER — OFFICE VISIT (OUTPATIENT)
Dept: SURGERY | Facility: CLINIC | Age: 59
End: 2019-05-16

## 2019-05-16 VITALS
TEMPERATURE: 98.6 F | WEIGHT: 228 LBS | OXYGEN SATURATION: 97 % | HEIGHT: 66 IN | DIASTOLIC BLOOD PRESSURE: 79 MMHG | SYSTOLIC BLOOD PRESSURE: 128 MMHG | HEART RATE: 90 BPM | BODY MASS INDEX: 36.64 KG/M2

## 2019-05-16 DIAGNOSIS — Z80.3 FH: BREAST CANCER: ICD-10-CM

## 2019-05-16 DIAGNOSIS — D05.11 DUCTAL CARCINOMA IN SITU (DCIS) OF RIGHT BREAST: Primary | ICD-10-CM

## 2019-05-16 DIAGNOSIS — N60.99 ATYPICAL LOBULAR HYPERPLASIA (ALH) OF BREAST: ICD-10-CM

## 2019-05-16 DIAGNOSIS — N60.99 ATYPICAL DUCTAL HYPERPLASIA OF BREAST: ICD-10-CM

## 2019-05-16 PROCEDURE — 99213 OFFICE O/P EST LOW 20 MIN: CPT | Performed by: SURGERY

## 2019-05-16 NOTE — PROGRESS NOTES
Chief Complaint: Candie Arias is a 59 y.o. female who was seen in consultation at the request of Jose Dorsey II, MD  for Breast cancer and a followup visit    History of Present Illness:  The patient comes today  reporting : abnormal LEFT breast imaging and biopsy report.   She had not noted any masses, skin changes, nipple changes, nipple discharge either breast at the time of her most recent imaging.  She had the below imaging and was found to have LEFT breast atypical hyperplasia bordering on DCIS, as well as ALH.  Her most recent imaging includes the followin-21-12 Screening mammogram [facility] Hugo Schreiber  There are scattered fibroglandular densities.   Finding 1: There are several punctate and fine granular calcifications with grouped distribution seen in the middle one-third upper outer region of the left breast. There is an increased number of calcifications.   Finding 2: Stable isodense, oval mass measuring 11 millimeters lower inner region of the left breast.   Finding 3: Stable isodense, oval mass measuring 11 millimeteres upper outer region of the right breast. Note is made of a biopsy clip as evidence of a previous surgical procedure.   Finding 4: Punctate and spherical calcifications seen in both breasts.   Impression:   Finding 1: Calcifications in the left breast require additional evaluation.   Finding 2: Benign-Negative.   Finding 3: Benign-Negative.   Finding 4: Benign-Negative.   BI-RADS Category 0    04-10-12 Left breast mammography [facility]  Impression:   Calcifications in the left breats are suspicious, stereotactic biopsy is recommended.   BIRADS Category 4B    Her most recent imaging prior to the above was: in     She then had the following biopsy:    12 Stereotactic biopsy [facility]  12 core needle biopsy specimens. 9 gauge vacuum assisted device. Marker clip was deployed.     12 Pathology results [facility]  Diagnosis:    Left breast upper outer quadrant.  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   Atypical lobular hyperplasia (ALH) is also noted.   Surounding breast parenchyma demonstrates pseudoangiomatous stromal hyperplasia, columnar cell hyperplasia, apocrine change, and fibrocystic changes.     She had a benign RIGHT breast biopsy in the past.  She is postmenopausal, has her uterus and ovaries and takes no hormones.   She has 4 sisters, 2 maternal aunts, 3 paternal aunts, one son. A niece  (her sister's daughter) had breast cancer diagnosed at age 38. That same sister had a son who  from testicular cancer in his 20s. No other breast and no ovarian cancer in her family.    Since our last visit, Mrs. Arias had her RIGHT breast MRI biopsy 12, which returned as benign. She has healed with some ecchymoses RIGHT breast.  The pathology returned as benign tissue with CCC and FCC.  She has seen Dr Kirkpatrick and he has sent a full series of labs for hypercoagulability- she has followup with him next week. Her cousin has had BRCA testing and was BRCA negative.     We excised her ADH/DCIS on 12. She has done well since that procedure with no complaints. Her pathology returned as additional ADH and ALH, no DCIS. margins clear. I did send her core biopsy pathology for review at [facility] to determine whether any DCIS present. The report showed atypical hyperplasia only.    SInce our last visit, Mrs. Arias has done well.  She has noted no changes in her breast exam. No new masses, skin changes, nipple changes, nipple discharge either breast.   She started tamoxifen 12 Dr. Sexton and is tolerating this.  She was seen by Dr. Mederos and felt not to need radiation based on pathology review.    She had 2 areas of SCC removed from her nose, Dr. Abrams in .  Her most  recent imaging includes the followin/22/13       Bilateral Diagnostic Mammogram       [facility]       Candie Arias  There are scattered fibroglandular densities.   Finding 1: Follow-up calcifications in the left breast does not persist.   Finding 2: Follow-up isodense, oval mass measuring 22 millimeters with circumscribed margins in the lower inner region of the left breast has decreased in size.   Finding 3: Multiple stable punctate and spherical lucent-centered calcifications seen in both breasts.   IMPRESSION:  Finding 1: Benign-Negative  Finding 2: Benign-Negative   Finding 3: Benign-Negative   BI-RADS Category 2: benign     In the interim, Mrs. Arias has done well. She has noted no changes in her breast exam- no new masses, skin changes, niplpechanges, nipple discharge either breast.  She has gained 17# and weighs 215 today.    SHe is having knee trouble.  She continues the tamoxifen and sees Dr Sexton for followup.  Her most recent imaging includes 3-2014 MRI at [facility] and mammogram at [facility] that are both BR2, see below.    Mrs. Arias's MRI 3-27-15 showed  LEFT breast 3:00 post op architectural change.  RIGHT breast showed anterior third 11:30 linear enhancement, borderline clumped, 6.4 cm AP x 2.8 cm sup to inf x 1.4 cm med to lat- rec biopsy, stereo if mammo correlate  No Right adenopathy.   On mammogram at [facility] there are new calcifications linearly distributed middle third UOQ, rec additional imaging.  Additional views showed 7-8 cm in the AP dimension of indeterminate calcifications that correlated with the MRI findings. Rec stereo    Stereo returned as DCIS, high grade, with comedo necrosis, at least 3mm, with calcifications.     She is here to rview and discuss treatment.    Recently, she has had the following occurrences:  1- LEFT knee replacement Dr Barney- postop infection, replacement of prosthesis and home antibiotics- either rocephin or vanco and she had a bad reaction. Dr Barney has since retired  and she will be seeing Dr. He.  2- Seen in 2013 for tachycardia by cardiology. Perioperatively had persistnet tachycardia per her report.  Saw Dr Moreira for this and he had her get a cardiac HENRY that was negative per her report.    Her review of systems is unchanged other than  the above since 4-4-14.  I have reviewed the patient's Past Medical History, Family History, Social History, medications and allergies and confirm that the information is up to date and accurate.      10-22-14- Saw a NP in Dr Roberts office and Dx with atrial bigenimy and tachycardia. Her free T4 was low at 0.76 and they rec FU with Dr Moreira  They then recommended Holter monitor. Normal 2D echo noted in 7-2010 after her dx atrial bigeminy.  Did not feel that chest pressure was characteristic to merit a stress test.    Dr Sexton called and stated that she did not need any additional prophylaxis for the MTHFR mutation    FH corrected- her neice who had breast cancer had carcinoid not ovarian cancer.    - Called Dr. He office- she had a drug induced fever, related to Rocephin not vanocmycin.    Dr Shearer stated:  stress scho with normal EF 60%, o/w normal, hx tachycardia and atrial bigenimy that is stable. She is low risk and since echo is normal can proceed.    Her genetics pandel returned as negative.    We went to the operating room on 5-20-15 for bilateral mastectomy and RIGHT SLNB returned as 3.5+ cm of multifocal DCIS with pagets of the nipple. DCIS present in UOQ, UIQ and LIQ.  High grade, comedonecrosis, and margins negative, 0/3 nodes, LEFT breast CCC, usual hyperplasia, and sclerosing adenosis. Margins clear closest 1.0 cm.   Stage [id number]- stage 0    In the interim,  Candie Arias  has done well.  She has noted no changes in her breast exam. No new masses, skin changes, skin discoloration on either reconstructed breast skin.  She denies headache, bone pain, belly pain, cough, changes in vision or gait.  Her most recent  imaging includes the following: None    She has lost 4 pounds.    She has stopped the tamoxifen as of June 16, 2015.        In the interim,  Candie Arias  has done well.  She has noted no changes in her reconstructed breast exam. No new masses, skin changes, either breast.   She denies headache, bone pain, belly pain, cough, changes in vision or gait.    She moved to [place] lymph there a year and moved back.  She did not see survivorship or nutrition.  Her weight is stable.  Her left knee replacement has had scarring and has demonstrated decreased mobility.        In the interim,  Candie Arias  has done well.  She has noted no changes in her reconstructed breast exam. No new masses, skin changes, either breast.   She denies headache, bone pain, belly pain, cough, changes in vision or gait.    She has gained 11 pounds in the interim.  In the interim she did go to see our nutritionist and she said that it wasn't useful but she just has a hard time following a diet.  She is having trouble with her left knee.    Interval History:    In the interim,  Candie Arias  has done well.  She has noted no changes in her reconstructed breast exam. No new masses, skin changes, either breast.   She denies headache, bone pain, belly pain, cough, changes in vision or gait.    She has gained 8 pounds in the interim.  She kindly tells me that she knows what  is supposed to do but has no self control and enjoys food.    Review of Systems:  Review of Systems   Constitutional: Positive for unexpected weight change (8 lb wt gain).   All other systems reviewed and are negative.       Past Medical and Surgical History:  Breast Biopsy History:  Patient has had the following breast biopsies:Patient has had  2 number of breast biopsies in the past.  .   She had one on the  left  side in year 5/12, as per HPI  She had one on the  right  side in year  01/10. The pathology from this biopsy was  benign per her report.  The patient  has never had a breast operation    Breast Cancer HIstory:  Patient does not have a past medical history of breast cancer.  Breast Operations, and year:  NONE  Obstetric/Gynecologic History:  Age menstrual periods began: 12  Patient is postmenopausal, entered menopause naturally at age: The date of her last menstrual period was  2007 at the time or Mirena IUD placement.  The patient started having perimenopausal symptoms in terms of hot flashes and mood lability since 2011 summer.   Number of pregnancies: 2  Number of live births: 1  Number of abortions or miscarriages: 1  Age of delivery of first child: 24  Patient did not breast feed.  Length of time taking birth control pills:30 YRS   Patient has never taken hormone replacement    Past Surgical History:   Procedure Laterality Date   • APPENDECTOMY  1972   • JOINT REPLACEMENT     • LAPAROSCOPIC CHOLECYSTECTOMY  2002   • MASTECTOMY     • TOTAL KNEE ARTHROPLASTY         Past Medical History:   Diagnosis Date   • Arthritis    • Back pain    • Benign essential hypertension    • Cancer (CMS/HCC)    • Hypertension    • Insomnia related to another mental disorder     ARTHRITIS   • Menopause    • Obesity        Prior Hospitalizations, other than for surgery or childbirth, and year:  NONE     Social History     Socioeconomic History   • Marital status:      Spouse name: Not on file   • Number of children: Not on file   • Years of education: Not on file   • Highest education level: Not on file   Tobacco Use   • Smoking status: Never Smoker   • Smokeless tobacco: Never Used   Substance and Sexual Activity   • Alcohol use: Yes     Comment: MODERATE    • Drug use: No     Patient is .  Patient is employed full time with the following occupation: CLERICAL  Patient drinks 1 servings of caffeine per day.    Family History:  Family History   Problem Relation Age of Onset   • Cancer Father    • Heart failure Mother    • Diabetes Other    • Breast cancer Other 38         "SISTER'S DAUGHTER   • Testicular cancer Other         20S-SISTER'S SON        Vital Signs:  /88 (BP Location: Left arm, Patient Position: Sitting, Cuff Size: Adult)   Pulse 91   Temp 98.6 °F (37 °C) (Temporal)   Ht 167.6 cm (66\")   Wt 103 kg (228 lb)   LMP  (LMP Unknown)   SpO2 97%   Breastfeeding? No   BMI 36.80 kg/m²      Medications:    Current Outpatient Medications:   •  Acetaminophen (TYLENOL) 325 MG capsule, Take  by mouth., Disp: , Rfl:   •  celecoxib (CeleBREX) 200 MG capsule, , Disp: , Rfl:   •  lisinopril-hydrochlorothiazide (PRINZIDE,ZESTORETIC) 20-12.5 MG per tablet, TAKE ONE TABLET BY MOUTH DAILY, Disp: 30 tablet, Rfl: 1  •  Loratadine 10 MG capsule, Take  by mouth., Disp: , Rfl:      Allergies:  Allergies   Allergen Reactions   • Rocephin [Ceftriaxone] Other (See Comments)     FEVER  INDUCED FEVER       Physical Examination:  General Appearance:  Patient is in no distress.  She is well kept and has an obese build.   Psychiatric:  Patient with appropriate mood and affect. Alert and oriented to self, time, and place.    Breast, RIGHT: Surgically absent with implant in place.  There is a well healed vertical incision starting at the mid intramammary crease.  No nodules or discolorations on the breast skin.    Breast, LEFT:  Surgically absent with implant in place.  There is a well healed vertical incision starting at the mid intramammary crease.  No nodules or discolorations on the breast skin.    Lymphatic:  There is no axillary, cervical, infraclavicular, or supraclavicular adenopathy bilaterally.  Eyes:  Pupils are round and reactive to light.  Cardiovascular:  Heart rate and rhythm are regular.  Respiratory:  Lungs are clear bilaterally with no crackles or wheezes in any lung field.  Gastrointestinal:  Abdomen is soft, nondistended, and nontender.  She has a vertical right lower quadrant incision from a remote appendectomy.  She has trocar incisions from her laparoscopic " cholecystectomy.    Musculoskeletal:  Good strength in all 4 extremities.   There is good range of motion in both shoulders.    Skin:  No new skin lesions or rashes on the skin excluding the breast (see breast exam above).        Imagin12 Screening mammogram [facility] Candie Hugo  There are scattered fibroglandular densities.   Finding 1: There are several punctate and fine granular calcifications with grouped distribution seen in the middle one-third upper outer region of the left breast. There is an increased number of calcifications.   Finding 2: Stable isodense, oval mass measuring 11 millimeters lower inner region of the left breast.   Finding 3: Stable isodense, oval mass measuring 11 millimeteres upper outer region of the right breast. Note is made of a biopsy clip as evidence of a previous surgical procedure.   Finding 4: Punctate and spherical calcifications seen in both breasts.   Impression:   Finding 1: Calcifications in the left breast require additional evaluation.   Finding 2: Benign-Negative.   Finding 3: Benign-Negative.   Finding 4: Benign-Negative.   BI-RADS Category 0    -10-12 Left breast mammography [facility]  Impression:   Calcifications in the left breats are suspicious, stereotactic biopsy is recommended.   BIRADS Category 4B    -17-12 Bilateral breast MRI [facility]  Within the posterior one-third upper outer quadrant left breast 2:30 o'clock position, there is a metallic clip denoting site of ADH/ ALH.   Within the upper-outer quadrant of the right breast at the 9:30 o'clock position 8.3 cm from the nipple. There is linear enhancement that extends over 1.3 cm in anterior posterior dimension. No mammographic correlate is appreciated.   Impression:   1.3 cm linear irregular enhancement 9:30 right breast 8 cm from the nipple. Correlation with an MR guided right breast biopsy is recommended.   Biopsy proven site of ADH and or ALH posterior one  third upper outer quadrant of the left breast without any suspicious surounding enhancement.   BIRADS Category 4    03/22/13       Bilateral Diagnostic Mammogram       [facility]       Candie Hugo  There are scattered fibroglandular densities.   Finding 1: Follow-up calcifications in the left breast does not persist.   Finding 2: Follow-up isodense, oval mass measuring 22 millimeters with circumscribed margins in the lower inner region of the left breast has decreased in size.   Finding 3: Multiple stable punctate and spherical lucent-centered calcifications seen in both breasts.   IMPRESSION:  Finding 1: Benign-Negative  Finding 2: Benign-Negative   Finding 3: Benign-Negative   BI-RADS Category 2: benign     03/26/2014      [facility]      Bilateral Breast MRI      Hugo,Candie  Posterior one-third left breast lateral to the plane of the nipple,there is a 2.2 cm area of fat necrosis. A 0.7 cm oil cyst is also noted within the left breast.  There are no findings suspicious for malignancy in either breast.  Birads Category 2:Benign    03/26/14        [facility]         Bilateral Screening Mammogram with Tomosynthesis       Candie Hugo  There are scattered fibroglandular densities.   Finding 1: Stable isodense, oval mass measuring 8 millimeters lower inner region of the left breast.   Finding 2: Multiple stable calcifications seen in both breasts.   Finding 3: Stable post surgical scar posterior one third upper outer region of the left breast.   IMPRESSION:  Finding 1: Benign Negative   Finding 2: Benign Negative  Finding 3: Benign Negative   BIRADS category 2: benign     03-27-15     [facility]    Bilateral Screen Mammogram w Tomosynthesis   Hugo, Candie    scattered fibroglandular densities  Finding 1:  There are new fine pleomorphic calcifications with Linear distribution seen in the  middle one third upper outer region of the right breast.  the new calcifications are flanked by the biopsy  clips in the  right upper outer quadrant.    Finding 2:  isodense, oval mass measuring 8 millimeters with circumscribed margins seen in the lower  inner region of the left breast.    This finding is most consistent with a cyst.    Finding 3:  stable post-surgical scar seen in the upper outer region of the left breast.  in a area of prior excisional biopsy.    Impression:    Finding 1:  Breast require additional evaluation, spot compression magnification views are recommended.  Finding 2:  Benign Negative  Finding 3:  Benign Negative    Birads Category 0    03-27-15     [facility]     Bilateral Breast MRI               Candie Arias  posterior one third lateral left breast 3 o'clock 10 cm from the nipple,  post surgical architectural distortion with a 1.8 cm oil cyst.  Within the right breast anterior one third centered 11:30 region of linear, borderline clumped, branching enhancement  that measures on the order of 6.4 cm in anterior to posterior dimension, 2.8 cm in the superior to inferior dimension and 1.4 cm in the medial to lateral dimension.  A mammographic  examination is not available for comparison.  I see no evidence for abnormal right breast skin, nipple, or chest wall enhancement and there  is no evidence for right axillary adenopathy.  Impression:  The imaging features are suspicious for the presence of atypia and/or DCIS.   Ultimately,  percutaneous biopsy of the region is recommended and can be performed  under MRI guidance,  however, if thre are calcifications seen in the region a stereotactic guided biopsy  can be performed.  There are no findings suspicious for malignancy in the left breast.  Birads Category 4 Suspicious      04/01/15           [facility]            RIGHT DIGITAL DIAG MAMMOGRAM WITH TOMOSYNTHESIS              Candie Hugo  Additional evaluation calcifications in the right breast, upper outer. there are multiple fine pleomorphic calcification with linear distraction in the middle one-third upper outer  "region of the right breast. There is an increased number of calcifications. Tissue sampling is recommended.   IMPRESSION:   BI-RADS Category 4C    Pathology:  05-03-12 Stereotactic biopsy [facility]  12 core needle biopsy specimens. 9 gauge vacuum assisted device. Marker clip was deployed.     05-03-12 Pathology results [facility]  Diagnosis:   Left breast upper outer quadrant.  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   Atypical lobular hyperplasia (ALH) is also noted.   Surounding breast parenchyma demonstrates pseudoangiomatous stromal hyperplasia, columnar cell hyperplasia, apocrine change, and fibrocystic changes.       6-6-12- OPER:  RIGHT BREAST MRI-GUIDED BIOPSY   Final Diagnosis  \"RIGHT BREAST LINEAR ENHANCEMENT AT 9:30\", CORE BIOPSIES:       FOCAL COLUMNAR CELL CHANGE WITHOUT ATYPIA.       MILD FIBROCYSTIC CHANGES WITH SCLEROSING ADENOSIS, DUCT ECTASIA.      7-5-12-  LEFT lumpectomy   Final Diagnosis  LEFT BREAST LUMPECTOMY:       BREAST TISSUE WITH FOCAL ATYPICAL DUCT HYPERPLASIA AT SITE OF PRIOR BIOPSY.       FOCI OF ATYPICAL LOBULAR HYPERPLASIA.       ADDITIONAL FIBROCYSTIC CHANGES INCLUDING APOCRINE METAPLASIA, COLUMNAR             CELL CHANGE, ADENOSIS AND MICROCYST FORMATION.    COMMENT: Margins free of atypical duct hyperplasia.  Atypical duct hyperplasia  is present on slide 1H.      04/02/15                 [facility]       Stereotactic Biopsy                Candie Arias  right breast  12 core needle biopsy specimens were obtained using a 9 gauge vacuum assisted device. Top hat shaped s-jorge l eviva tissue marker was deployed within the biopsy bed.   ADDENDUM  The pathology report from [facility] was received on 4/3/2015, and the initial report demonstrated benign pathology. This was discordant. Jessica Potter did additional cuts and the study " demonstrated ductal carcinoma in situ (DCIS), high grade with comedo necrosis. This is concordant.   Please note that the calcifications extend from the AP dimension approximately 7 cm. This roughly correlates to the recent findings on the MRI. These Calcifications are also located between two previously biopsy clip markers. Stereotactic biopsy that I performed is in the center of these two and is a top hat shaped marker.     04/02/15             [facility]                   Pathology Report                  Candie Arias   DIAGNOSIS  Right breast upper outer quadrant, stereotactic bore biopsies:  ductal carcinoma in situ (DCIS), high grade with comedo Necrosis , at least 0.3 cm in dimension with determinate microcalcifications.   ORIGINAL DIAGNOSIS:  Right breast upper outer quadrant, stereotactic core biopsies:   Benign breast parenchyma with areas of mild hyalinize stromal change and occasional microcalcifications.   Negative for atypia or malignancy.       Received: 5/20/2015  Reported: 5/21/2015    Clinical Diagnosis and History       Right breast cancer/DCIS       Operation: Bilateral total breast mastectomy with right axilla sentinel  node biopsy - frozen section       Diagnosis  1: SENTINEL LYMPH NODE #1, RIGHT AXILLA, EXCISION:       ONE LYMPH NODE, NEGATIVE FOR METASTATIC CARCINOMA.     2: SENTINEL LYMPH NODE #2, RIGHT AXILLA, EXCISION:       ONE LYMPH NODE, NEGATIVE FOR METASTATIC CARCINOMA.     3: SENTINEL LYMPH NODE #3, RIGHT AXILLA, EXCISION:       ONE LYMPH NODE, NEGATIVE FOR METASTATIC CARCINOMA.     4: BREAST, RIGHT, MASTECTOMY:       MULTIFOCAL DUCTAL CARCINOMA IN SITU (DCIS).        FOCAL PAGET'S DISEASE OF NIPPLE (CONFIRMED WITH IMMUNOHISTOCHEMICAL      STAIN FOR CYTOKERATIN 7).        NO DEFINITE INVASION IS SEEN IN SECTIONS EXAMINED.        SEE SYNOPTIC REPORT (BELOW) FOR ADDITIONAL DETAILS.     5: BREAST, LEFT, MASTECTOMY:       EXTENSIVE FIBROSIS IN UPPER OUTER QUADRANT, SUGGESTIVE OF SITE OF  PRIOR       LUMPECTOMY ( JULY 2012).       FIBROCYSTIC CHANGES WITH DUCT DILATATION AND STASIS, MICROCYST FORMATION,      AND APOCRINE METAPLASIA.        COLUMNAR CELL CHANGE WITHOUT ATYPIA.        FOCAL DUCTAL HYPERPLASIA OF THE USUAL TYPE.        FOCAL DUCT/CYST RUPTURE WITH ASSOCIATED FOREIGN BODY-TYPE GIANT CELL      REACTION.        FOCAL SCLEROSING ADENOSIS.       UNREMARKABLE NIPPLE.        NO EVIDENCE OF MALIGNANCY.       SYNOPTIC REPORT (BASED ON CAP CANCER CASE SUMMARY, version December 2013):       Procedure: Total mastectomy (including nipple and skin).       Lymph node sampling: [place] lymph nodes.        Specimen laterality: Right.        Tumor site: Multiple foci of DCIS in upper outer quadrant, mid to upper  inner quadrant, and focally            in lower inner quadrant.        Size (extent) of DCIS: Approximately 3.5 cm in mid superior breast and  involving upper inner and      upper outer quadrants (estimate based on gross evaluation).        Histologic type: Ductal carcinoma in situ.             Architectural patterns: Solid and comedo.             Nuclear grade: Grade III (high).             Necrosis: Present, central comedonecrosis.       Macroscopic and microscopic extent of tumor:            Skin: Not applicable.            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            Skeletal muscle: Not applicable.        Margins: Uninvolved by DCIS.             Distance from closest margin: Greater than 10 mm in sections examined  (superficial and           deep margins).        Treatment effect (response to presurgical neoadjuvant therapy): No known  presurgical       therapy.        Lymph nodes:            Total number of lymph nodes examined (sentinel and nonsentinel): 3.            Number of sentinel lymph nodes examined: 3.            Number of lymph nodes with macrometastases: 0.            Number of lymph nodes with micrometastases: 0.            Number of lymph  nodes with isolated tumor cells: 0.            Method of evaluation of sentinel lymph nodes: H&E, multiple levels  (confirmatory           immunohistochemical stains are available upon request).        Pathologic staging:             Primary tumor: pTis (DCIS).             Regional lymph nodes: pN0(sn).            Distant metastasis: Not applicable.        Additional pathologic findings in Right Breast:        Fibrocystic changes with duct dilatation and stasis and apocrine  metaplasia.             Focal sclerosing adenosis.            Columnar cell change without atypia.             Ductal hyperplasia of the usual type.             At least two separate biopsy sites identified.         Ancillary studies: ER, MN, and Ki-67 studies performed on previous biopsy  specimen at outside      facility ([facility]).        Microcalcifications: Present in both DCIS and nonneoplastic tissue.       TDJ/hmf IHC/a/CMK      Procedures:      Assessment:   Diagnosis Plan   1. Ductal carcinoma in situ (DCIS) of right breast     2. Atypical lobular hyperplasia (ALH) of breast     3. Atypical ductal hyperplasia of breast     4. FH: breast cancer       1-  RIGHT breast 6.4 cm span enhancement on MRI AP- 11:30 anterior third; associated 7 -8 cm calcifications on mammogram  Clinical stage TisN0- stage 0    5-20-15 bilateral mastectomy and RIGHT SLNB returned as 3.5+ cm of multifocal DCIS with pagets of the nipple. DCIS present in UOQ, UIQ and LIQ.  High grade, comedonecrosis, and margins negative, 0/3 nodes, LEFT breast CCC, usual hyperplasia, and sclerosing adenosis. Margins clear closest 1.0 cm.   Stage [id number]- stage 0  - recosntruction with expanders, mil, Dr Waterhouse  - did not need XRT based on clear margins        2-3  -LEFT breast ADH-borderline DCIS and LEFT breast ALH on core biopsy  - both located at site of mammographic calcificaitons, 1.6 cm AP on mammo, middle 1/3 OUQ  - 7-2012 excision atypical hyerplasia-  residual ADH and ALH noted  - [facility] review at [facility] on cores suggest no DCIS present on initial cores  - Her sister has a protein S deficiency and hypercoagulable state with prior pulmonary emboli-  CBC found to have mutation in the MTHFR gene (clotting) , but that this is insignificant for perioperative considerations . Not on antothombotic agents  -  tamoxifen 7-18-12 -Dr. Sexton - stopped 6-16-15 after mastectomies  - seen by Dr. Mederos- did not need radiation  -11-4-15 implant placement Dr Waterhouse    4  maternal niece age 38- she was tested and BRCA negative  Patient: 04/24/15                  Gene Dx             Candie Arias  Comprehensive Cancer Panel   Results Summary:  NEGATIVE     Plan:  Candie Arias is doing well today at her 3 year visit. We reviewed her interval history, examination and symptoms. There is no evidence of disease today.    We previously discussed the nature of the survivorship initiative and she elected not to pursue this.  We have discussed several times maintaining a healthy weight and the importance on recurrence risk.   She has seen our nutritionist. SHe has had additional surery on her LEFT knee, and although initially did better postoperatively now is in a similar situation as last year when I saw her where she has limited range of motion in her left knee and cannot even take daily walks.  We discussed the importance of exercise and I recommended that she try swimming or 1 of the classes at the [facility].  She is interested in this.  I also discussed with her her surveillance.  We will see her back in 1 year with no imaging.  I instructed her on self breast exam.  Next year will be her 5-year visit.    We also discussed the option of 3D nipple tattooing and I gave her a recommendation for [facility] for this.    I asked her to continue her self breast exam and to call in the interim with concerns or changes.      Ana Duque MD        Next Appointment:  Return in about  1 year (around 5/16/2020) for no imaging.

## 2019-05-28 RX ORDER — LISINOPRIL AND HYDROCHLOROTHIAZIDE 20; 12.5 MG/1; MG/1
TABLET ORAL
Qty: 30 TABLET | Refills: 0 | Status: SHIPPED | OUTPATIENT
Start: 2019-05-28 | End: 2019-06-24 | Stop reason: SDUPTHER

## 2019-06-24 ENCOUNTER — OFFICE VISIT (OUTPATIENT)
Dept: FAMILY MEDICINE CLINIC | Facility: CLINIC | Age: 59
End: 2019-06-24

## 2019-06-24 ENCOUNTER — RESULTS ENCOUNTER (OUTPATIENT)
Dept: FAMILY MEDICINE CLINIC | Facility: CLINIC | Age: 59
End: 2019-06-24

## 2019-06-24 VITALS
TEMPERATURE: 97.6 F | RESPIRATION RATE: 20 BRPM | WEIGHT: 229 LBS | BODY MASS INDEX: 36.8 KG/M2 | HEART RATE: 97 BPM | OXYGEN SATURATION: 97 % | DIASTOLIC BLOOD PRESSURE: 68 MMHG | HEIGHT: 66 IN | SYSTOLIC BLOOD PRESSURE: 118 MMHG

## 2019-06-24 DIAGNOSIS — I10 ESSENTIAL HYPERTENSION: Primary | ICD-10-CM

## 2019-06-24 DIAGNOSIS — Z11.9 SCREENING EXAMINATION FOR INFECTIOUS DISEASE: ICD-10-CM

## 2019-06-24 DIAGNOSIS — Z96.652 HISTORY OF TOTAL LEFT KNEE REPLACEMENT: ICD-10-CM

## 2019-06-24 DIAGNOSIS — Z12.11 SCREEN FOR COLON CANCER: ICD-10-CM

## 2019-06-24 DIAGNOSIS — Z00.00 ROUTINE GENERAL MEDICAL EXAMINATION AT A HEALTH CARE FACILITY: ICD-10-CM

## 2019-06-24 DIAGNOSIS — R53.83 OTHER FATIGUE: ICD-10-CM

## 2019-06-24 PROCEDURE — 99396 PREV VISIT EST AGE 40-64: CPT | Performed by: NURSE PRACTITIONER

## 2019-06-24 RX ORDER — LISINOPRIL AND HYDROCHLOROTHIAZIDE 20; 12.5 MG/1; MG/1
1 TABLET ORAL DAILY
Qty: 90 TABLET | Refills: 1 | Status: SHIPPED | OUTPATIENT
Start: 2019-06-24 | End: 2019-12-18

## 2019-06-24 NOTE — PROGRESS NOTES
Subjective   Candie Arias is a 59 y.o. female.     Chief Complaint   Patient presents with   • Annual Exam   • Establish Care   • Hypertension      HPI patient is new to me.  She is here to establish care for hypertension.  She takes and tolerates her blood pressure medicine as directed and feels like it is well controlled.  Her main health complaints currently is her left knee pain which has been ongoing and she is followed by Dr. Sroto for orthopedics.  She also has chronic pain and fatigue.    She has ongoing left knee pan status post left TKA which continue to cause her problems and so she followed up with another surgeon for second opinion and had orthoscopic cleanout of left knee but still has had ongoing problems.  She has decreased range of motion of her left knee and pain when walking making exercise very difficult.    She also has ongoing overall pain mostly in her legs and lower trunk. Followed by rheumatology every 6 months.  She has never been diagnosed with rheumatoid but does have a family history.  All of her rheumatoid markers so far have been negative.  She has been on various medications which she cannot remember for pain control.  She has not been on Lyrica and we discussed this with her rheumatologist.  She does not want to start on it prior to seeing rheumatology.    She is a history of right ductal carcinoma which she reports was approximately two thirds of her right breast and so she elected to have bilateral mastectomy and is doing since then.  She has not had a Pap in a few years but believes they were all normal.  She is planning to follow-up for routine GYN soon.    She has never had a colonoscopy, denies any personal or family history of colorectal cancer.  She has been concerned about the bowel prep but is willing to get a Cologuard today.    She is  and has a grown son who she sees every weekend.  She is no longer working full-time, struggles to be active due to knee pain  "and fatigue but does try to work in her garden intermittently.  She tries to eat healthfully but struggles with this as she enjoys eating.    Social History     Tobacco Use   • Smoking status: Never Smoker   • Smokeless tobacco: Never Used   Substance Use Topics   • Alcohol use: Yes     Comment: MODERATE    • Drug use: No       The following portions of the patient's history were reviewed and updated as appropriate: allergies, current medications, past family history, past medical history, past social history, past surgical history and problem list.    Review of Systems   Constitutional: Positive for fatigue. Negative for appetite change and unexpected weight change.   HENT: Negative for congestion, ear discharge, ear pain, rhinorrhea, sore throat and trouble swallowing.    Eyes: Negative for pain and itching.   Respiratory: Negative for cough and shortness of breath.    Cardiovascular: Negative for chest pain and palpitations.   Gastrointestinal: Negative for abdominal pain, blood in stool, constipation, diarrhea, nausea and vomiting.   Endocrine: Negative for cold intolerance, heat intolerance, polydipsia and polyuria.   Genitourinary: Negative for dysuria, hematuria and vaginal bleeding.   Musculoskeletal: Positive for arthralgias, gait problem (Secondary to left knee pain) and myalgias.   Skin: Negative for rash.   Allergic/Immunologic: Positive for environmental allergies (Intermittent and controlled with OTC meds).   Neurological: Negative for seizures and headaches.   Psychiatric/Behavioral: Negative for dysphoric mood and sleep disturbance. The patient is not nervous/anxious.        Objective   Blood pressure 118/68, pulse 97, temperature 97.6 °F (36.4 °C), resp. rate 20, height 167.6 cm (66\"), weight 104 kg (229 lb), SpO2 97 %, not currently breastfeeding.    Physical Exam   Constitutional: She is oriented to person, place, and time. She appears well-developed and well-nourished. No distress.   HENT: "   Head: Normocephalic and atraumatic.   Right Ear: Tympanic membrane, external ear and ear canal normal.   Left Ear: Tympanic membrane, external ear and ear canal normal.   Mouth/Throat: Uvula is midline and oropharynx is clear and moist.   Eyes: Conjunctivae and EOM are normal. Pupils are equal, round, and reactive to light. Right eye exhibits no discharge. Left eye exhibits no discharge.   Neck: Neck supple. No thyromegaly present.   Cardiovascular: Normal rate, regular rhythm, normal heart sounds and intact distal pulses.   No murmur heard.  Pulmonary/Chest: Effort normal and breath sounds normal.   Abdominal: Soft. Bowel sounds are normal. There is no hepatosplenomegaly. There is no tenderness.   Musculoskeletal: She exhibits no deformity.   Gait antalgic but steady  decreased range of motion with enlarged left knee joint.  No erythema or warmth or swelling noted.   Lymphadenopathy:     She has no cervical adenopathy.   Neurological: She is alert and oriented to person, place, and time. No cranial nerve deficit.   Skin: Skin is warm and dry.   Psychiatric: She has a normal mood and affect.   Nursing note and vitals reviewed.      Assessment   Problem List Items Addressed This Visit        Cardiovascular and Mediastinum    Essential hypertension - Primary    Relevant Medications    lisinopril-hydrochlorothiazide (PRINZIDE,ZESTORETIC) 20-12.5 MG per tablet    Other Relevant Orders    CBC & Differential    Basic Metabolic Panel       Other    Hx of total knee arthroplasty      Other Visit Diagnoses     Screen for colon cancer        Relevant Orders    Cologuard - Stool, Per Rectum    Screening examination for infectious disease        Relevant Orders    Hepatitis C Antibody    Routine general medical examination at a health care facility        Relevant Orders    CBC & Differential    Basic Metabolic Panel    TSH Rfx On Abnormal To Free T4    Lipid Panel With LDL / HDL Ratio    Other fatigue        Relevant Orders     TSH Rfx On Abnormal To Free T4           Procedures PHQ-9 Total Score: 4   JORDAN 7 Total Score: 0               Impression and Plan: I reviewed her most recent blood work as well as her last PCP note.  We will get yearly labs as well as a thyroid due to her fatigue and recent ongoing weight gain due to lack of exercise secondary to left knee pain.  We did discuss what she can do as far as exercise but she is limited.  We also discussed trying to control her diet and calorie intake for the amount of activity she is able to do.  We discussed option for Lyrica, I suspect she may have some sort of chronic fatigue or fibromyalgia overlying her joint pain.  She will discuss Lyrica with her rheumatology and does not want to start it until she follows up with her rheumatologist which is coming up in August.    After long discussion she is not willing to do a colonoscopy but is willing to do a Cologuard.  She has had both her sisters do Cologuard's and they have been negative.    I have encouraged her to follow-up with her GYN for Pap which if she has HPV cotesting and normal Paps in the past this may be her last Pap.  If she does not want to follow-up with GYN I will be happy for her to return for a Pap here.    We will do her one-time hep C screening for baby boomers.      There are no preventive care reminders to display for this patient.           EMR Dragon/Transcription disclaimer:   Much of this encounter note is an electronic transcription/translation of spoken language to printed text. The electronic translation of spoken language may permit erroneous, or at times, nonsensical words or phrases to be inadvertently transcribed; Although I have reviewed the note for such errors, some may still exist.

## 2019-06-25 LAB
BASOPHILS # BLD AUTO: 0.04 10*3/MM3 (ref 0–0.2)
BASOPHILS NFR BLD AUTO: 0.6 % (ref 0–1.5)
BUN SERPL-MCNC: 23 MG/DL (ref 8–23)
BUN/CREAT SERPL: 29.1 (ref 7–25)
CALCIUM SERPL-MCNC: 10.2 MG/DL (ref 8.6–10.5)
CHLORIDE SERPL-SCNC: 103 MMOL/L (ref 98–107)
CHOLEST SERPL-MCNC: 212 MG/DL (ref 0–200)
CO2 SERPL-SCNC: 25.5 MMOL/L (ref 22–29)
CREAT SERPL-MCNC: 0.79 MG/DL (ref 0.57–1)
EOSINOPHIL # BLD AUTO: 0.16 10*3/MM3 (ref 0–0.4)
EOSINOPHIL NFR BLD AUTO: 2.5 % (ref 0.3–6.2)
ERYTHROCYTE [DISTWIDTH] IN BLOOD BY AUTOMATED COUNT: 13.5 % (ref 12.3–15.4)
GLUCOSE SERPL-MCNC: 129 MG/DL (ref 65–99)
HBA1C MFR BLD: 6.2 % (ref 4.8–5.6)
HCT VFR BLD AUTO: 48.8 % (ref 34–46.6)
HCV AB S/CO SERPL IA: <0.1 S/CO RATIO (ref 0–0.9)
HDLC SERPL-MCNC: 57 MG/DL (ref 40–60)
HGB BLD-MCNC: 15 G/DL (ref 12–15.9)
IMM GRANULOCYTES # BLD AUTO: 0.05 10*3/MM3 (ref 0–0.05)
IMM GRANULOCYTES NFR BLD AUTO: 0.8 % (ref 0–0.5)
LDLC SERPL CALC-MCNC: 112 MG/DL (ref 0–100)
LDLC/HDLC SERPL: 1.96 {RATIO}
LYMPHOCYTES # BLD AUTO: 0.96 10*3/MM3 (ref 0.7–3.1)
LYMPHOCYTES NFR BLD AUTO: 14.8 % (ref 19.6–45.3)
Lab: NORMAL
MCH RBC QN AUTO: 28.8 PG (ref 26.6–33)
MCHC RBC AUTO-ENTMCNC: 30.7 G/DL (ref 31.5–35.7)
MCV RBC AUTO: 93.7 FL (ref 79–97)
MONOCYTES # BLD AUTO: 0.45 10*3/MM3 (ref 0.1–0.9)
MONOCYTES NFR BLD AUTO: 6.9 % (ref 5–12)
NEUTROPHILS # BLD AUTO: 4.82 10*3/MM3 (ref 1.7–7)
NEUTROPHILS NFR BLD AUTO: 74.4 % (ref 42.7–76)
NRBC BLD AUTO-RTO: 0 /100 WBC (ref 0–0.2)
PLATELET # BLD AUTO: 312 10*3/MM3 (ref 140–450)
POTASSIUM SERPL-SCNC: 4.6 MMOL/L (ref 3.5–5.2)
RBC # BLD AUTO: 5.21 10*6/MM3 (ref 3.77–5.28)
SODIUM SERPL-SCNC: 143 MMOL/L (ref 136–145)
TRIGL SERPL-MCNC: 216 MG/DL (ref 0–150)
TSH SERPL DL<=0.005 MIU/L-ACNC: 1.13 MIU/ML (ref 0.27–4.2)
VLDLC SERPL CALC-MCNC: 43.2 MG/DL
WBC # BLD AUTO: 6.48 10*3/MM3 (ref 3.4–10.8)
WRITTEN AUTHORIZATION: NORMAL

## 2019-06-27 NOTE — PROGRESS NOTES
Spoke in detail with Patient about results, patient expressed understanding and will follow up as agreed.     Any pending Labs and/or Diagnostic procedures required have been ordered for future release.    Yandy BOOTH

## 2019-12-18 RX ORDER — LISINOPRIL AND HYDROCHLOROTHIAZIDE 20; 12.5 MG/1; MG/1
TABLET ORAL
Qty: 90 TABLET | Refills: 0 | Status: SHIPPED | OUTPATIENT
Start: 2019-12-18 | End: 2020-03-20

## 2020-03-20 ENCOUNTER — E-VISIT (OUTPATIENT)
Dept: FAMILY MEDICINE CLINIC | Facility: CLINIC | Age: 60
End: 2020-03-20

## 2020-03-20 DIAGNOSIS — J06.9 ACUTE URI: Primary | ICD-10-CM

## 2020-03-20 DIAGNOSIS — J30.9 ALLERGIC RHINITIS, UNSPECIFIED SEASONALITY, UNSPECIFIED TRIGGER: ICD-10-CM

## 2020-03-20 PROCEDURE — 99421 OL DIG E/M SVC 5-10 MIN: CPT | Performed by: NURSE PRACTITIONER

## 2020-03-20 RX ORDER — LISINOPRIL AND HYDROCHLOROTHIAZIDE 20; 12.5 MG/1; MG/1
TABLET ORAL
Qty: 90 TABLET | Refills: 0 | Status: SHIPPED | OUTPATIENT
Start: 2020-03-20 | End: 2020-06-18

## 2020-03-20 NOTE — PROGRESS NOTES
Candie FELIPE Bardwell    1960  [id number]    I have reviewed the e-Visit questionnaire and patient's answers, my assessment and plan are as follows:    HPI pt has had over 1 week of allergy type symptoms with a worsening dry cough which are somewhat improved with dayquil, tussionex.  She is also taking daily allergy meds.  Cough is both day and night, deep and barking and associated with heaviness in her chest. Cough is main concern.    She has been self quarantined since this past weekend.  She did have recent travel but not international and no known exposure    Review of Systems - General ROS: positive for  - sleep disturbance  negative for - fever  ENT ROS: positive for - headaches, nasal congestion, nasal discharge and sore throat  negative for - ear pain  Respiratory ROS: negative for - shortness of breath or sputum changes      Diagnoses and all orders for this visit:    Acute URI  -     HYDROcod Polst-CPM Polst ER (Tussionex Pennkinetic ER) 10-8 MG/5ML ER suspension; Take 5 mL by mouth Every 12 (Twelve) Hours As Needed for Cough.    Allergic rhinitis, unspecified seasonality, unspecified trigger  -     HYDROcod Polst-CPM Polst ER (Tussionex Pennkinetic ER) 10-8 MG/5ML ER suspension; Take 5 mL by mouth Every 12 (Twelve) Hours As Needed for Cough.        Any medications prescribed have been sent electronically to   [id number] [place] [address] - [phone]  - [phone]   [id number] [place] [address]  Phone: [phone] Fax: [phone]        Ester Ignacio, ALEJANDRO  03/20/20  12:04 PM    Time spent on encounter was 15 mins.

## 2020-03-20 NOTE — PATIENT INSTRUCTIONS
How to Quarantine at Home  Information for Patients and Families    These instructions are for people with confirmed or suspected COVID-19 who do not need to be hospitalized and those with confirmed COVID-19 who were hospitalized and discharged to care for themselves at home.    If you were tested through the Health Department  The Health Department will monitor your wellbeing.  If it is determined that you do not need to be hospitalized and can be isolated at home, you will be monitored by staff from your local or state health department.     If you were tested through a [facility]  You will need to monitor yourself and report changes in your symptoms to your doctor.  See the section below called Monitor Your Symptoms.    Follow these steps until a healthcare provider or local or state health department says you can return to your normal activities.    Stay home except to get medical care  Restrict activities outside your home, except for getting medical care.   Do not go to work, school, or public areas.   Avoid using public transportation, ride-sharing, or taxis.    Separate yourself from other people and animals in your home  People  As much as possible, you should stay in a specific room and away from other people in your home. Also, you should use a separate bathroom, if available.    Animals  You should restrict contact with pets and other animals while you are sick with COVID-19, just like you would around other people. When possible, have another member of your household care for your animals while you are sick. If you are sick with COVID-19, avoid contact with your pet, including petting, snuggling, being kissed or licked, and sharing food. If you must care for your pet or be around animals while you are sick, wash your hands before and after you interact with pets and wear a facemask. See COVID-19 and Animals for more information.    Call ahead before visiting your doctor  If you have a medical  appointment, call the healthcare provider and tell them that you have or may have COVID-19. This information will help the healthcare provider’s office take steps to keep other people from getting infected or exposed.    Wear a facemask  You should wear a facemask when you are around other people (e.g., sharing a room or vehicle) or pets and before you enter a healthcare provider’s office.     If you are not able to wear a facemask (for example, because it causes trouble breathing), then people who live with you should not stay in the same room with you, or they should wear a facemask if they enter your room.    Cover your coughs and sneezes  Cover your mouth and nose with a tissue when you cough or sneeze.   Throw used tissues in a lined trash can.   Immediately wash your hands with soap and water for at least 20 seconds or, if soap and water are not available, clean your hands with an alcohol-based hand  that contains at least 60% alcohol.    Clean your hands often  Wash your hands often with soap and water for at least 20 seconds, especially after blowing your nose, coughing, or sneezing; going to the bathroom; and before eating or preparing food.     If soap and water are not readily available, use an alcohol-based hand  with at least 60% alcohol, covering all surfaces of your hands and rubbing them together until they feel dry.    Soap and water are the best option if hands are visibly dirty. Avoid touching your eyes, nose, and mouth with unwashed hands.    Avoid sharing personal household items  You should not share dishes, drinking glasses, cups, eating utensils, towels, or bedding with other people or pets in your home.   After using these items, they should be washed thoroughly with soap and water.    Clean all “high-touch” surfaces everyday  High touch surfaces include counters, tabletops, doorknobs, bathroom fixtures, toilets, phones, keyboards, tablets, and bedside tables.   Also, clean  any surfaces that may have blood, stool, or body fluids on them.   Use a household cleaning spray or wipe, according to the label instructions. Labels contain instructions for safe and effective use of the cleaning product, including precautions you should take when applying the product, such as wearing gloves and making sure you have good ventilation during use of the product.    Monitor your symptoms  Seek prompt medical attention if your illness is worsening (e.g., difficulty breathing).   Before seeking care, call your healthcare provider and tell them that you have, or are being evaluated for, COVID-19.   Put on a facemask before you enter the facility.     These steps will help the healthcare provider’s office to keep other people in the office or waiting room from getting infected or exposed.   Persons who are placed under active monitoring or facilitated self-monitoring should follow instructions provided by their local health department or occupational health professionals, as appropriate.  If you have a medical emergency and need to call 911, notify the dispatch personnel that you have, or are being evaluated for COVID-19. If possible, put on a facemask before emergency medical services arrive.    Discontinuing home isolation  Patients with confirmed COVID-19 should remain under home isolation precautions until the risk of secondary transmission to others is thought to be low. The decision to discontinue home isolation precautions should be made on a case-by-case basis, in consultation with healthcare providers and state and local health departments.    The below content are for household members, intimate partners, and caregivers of a patient with symptomatic laboratory-confirmed COVID-19 or a patient under investigation:    Household members, intimate partners, and caregivers may have close contact with a person with symptomatic, laboratory-confirmed COVID-19 or a person under investigation.     Close  contacts should monitor their health; they should call their healthcare provider right away if they develop symptoms suggestive of COVID-19 (e.g., fever, cough, shortness of breath)     Close contacts should also follow these recommendations:  Make sure that you understand and can help the patient follow their healthcare provider’s instructions for medication(s) and care. You should help the patient with basic needs in the home and provide support for getting groceries, prescriptions, and other personal needs.  Monitor the patient’s symptoms. If the patient is getting sicker, call his or her healthcare provider and tell them that the patient has laboratory-confirmed COVID-19. This will help the healthcare provider’s office take steps to keep other people in the office or waiting room from getting infected. Ask the healthcare provider to call the local or [facility] health department for additional guidance. If the patient has a medical emergency and you need to call 911, notify the dispatch personnel that the patient has, or is being evaluated for COVID-19.  Household members should stay in another room or be  from the patient as much as possible. Household members should use a separate bedroom and bathroom, if available.  Prohibit visitors who do not have an essential need to be in the home.  Household members should care for any pets in the home. Do not handle pets or other animals while sick.  For more information, see COVID-19 and Animals.  Make sure that shared spaces in the home have good air flow, such as by an air conditioner or an opened window, weather permitting.  Perform hand hygiene frequently. Wash your hands often with soap and water for at least 20 seconds or use an alcohol-based hand  that contains 60 to 95% alcohol, covering all surfaces of your hands and rubbing them together until they feel dry. Soap and water should be used preferentially if hands are visibly dirty.  Avoid touching  your eyes, nose, and mouth with unwashed hands.  The patient should wear a facemask when you are around other people. If the patient is not able to wear a facemask (for example, because it causes trouble breathing), you, as the caregiver, should wear a mask when you are in the same room as the patient.  Wear a disposable facemask and gloves when you touch or have contact with the patient’s blood, stool, or body fluids, such as saliva, sputum, nasal mucus, vomit, or urine.   Throw out disposable facemasks and gloves after using them. Do not reuse.  When removing personal protective equipment, first remove and dispose of gloves. Then, immediately clean your hands with soap and water or alcohol-based hand . Next, remove and dispose of facemask, and immediately clean your hands again with soap and water or alcohol-based hand .  Avoid sharing household items with the patient. You should not share dishes, drinking glasses, cups, eating utensils, towels, bedding, or other items. After the patient uses these items, you should wash them thoroughly (see below “Wash laundry thoroughly”).  Clean all “high-touch” surfaces, such as counters, tabletops, doorknobs, bathroom fixtures, toilets, phones, keyboards, tablets, and bedside tables, every day. Also, clean any surfaces that may have blood, stool, or body fluids on them.   Use a household cleaning spray or wipe, according to the label instructions. Labels contain instructions for safe and effective use of the cleaning product including precautions you should take when applying the product, such as wearing gloves and making sure you have good ventilation during use of the product.  Wash laundry thoroughly.   Immediately remove and wash clothes or bedding that have blood, stool, or body fluids on them.  Wear disposable gloves while handling soiled items and keep soiled items away from your body. Clean your hands (with soap and water or an alcohol-based hand  ) immediately after removing your gloves.  Read and follow directions on labels of laundry or clothing items and detergent. In general, using a normal laundry detergent according to washing machine instructions and dry thoroughly using the warmest temperatures recommended on the clothing label.  Place all used disposable gloves, facemasks, and other contaminated items in a lined container before disposing of them with other household waste. Clean your hands (with soap and water or an alcohol-based hand ) immediately after handling these items. Soap and water should be used preferentially if hands are visibly dirty.  Discuss any additional questions with your state or local health department or healthcare provider.    Adapted from information provided by the Centers for Disease Control and Prevention.  For more information, visit https://www.cdc.gov/coronavirus/2019-ncov/hcp/guidance-prevent-spread.htmlCough, Adult    A cough helps to clear your throat and lungs. A cough may last only 2-3 weeks (acute), or it may last longer than 8 weeks (chronic). Many different things can cause a cough. A cough may be a sign of an illness or another medical condition.  Follow these instructions at home:  · Pay attention to any changes in your cough.  · Take medicines only as told by your doctor.  ? If you were prescribed an antibiotic medicine, take it as told by your doctor. Do not stop taking it even if you start to feel better.  ? Talk with your doctor before you try using a cough medicine.  · Drink enough fluid to keep your pee (urine) clear or pale yellow.  · If the air is dry, use a cold steam vaporizer or humidifier in your home.  · Stay away from things that make you cough at work or at home.  · If your cough is worse at night, try using extra pillows to raise your head up higher while you sleep.  · Do not smoke, and try not to be around smoke. If you need help quitting, ask your doctor.  · Do not have  caffeine.  · Do not drink alcohol.  · Rest as needed.  Contact a doctor if:  · You have new problems (symptoms).  · You cough up yellow fluid (pus).  · Your cough does not get better after 2-3 weeks, or your cough gets worse.  · Medicine does not help your cough and you are not sleeping well.  · You have pain that gets worse or pain that is not helped with medicine.  · You have a fever.  · You are losing weight and you do not know why.  · You have night sweats.  Get help right away if:  · You cough up blood.  · You have trouble breathing.  · Your heartbeat is very fast.  This information is not intended to replace advice given to you by your health care provider. Make sure you discuss any questions you have with your health care provider.  Document Released: 08/30/2012 Document Revised: 05/25/2017 Document Reviewed: 02/24/2016  [employer] Interactive Patient Education © 2019 [employer]  Upper Respiratory Infection, Adult  An upper respiratory infection (URI) is a common viral infection of the nose, throat, and upper air passages that lead to the lungs. The most common type of URI is the common cold. URIs usually get better on their own, without medical treatment.  What are the causes?  A URI is caused by a virus. You may catch a virus by:  · Breathing in droplets from an infected person's cough or sneeze.  · Touching something that has been exposed to the virus (contaminated) and then touching your mouth, nose, or eyes.  What increases the risk?  You are more likely to get a URI if:  · You are very young or very old.  · It is catrina or winter.  · You have close contact with others, such as at a , school, or health care facility.  · You smoke.  · You have long-term (chronic) heart or lung disease.  · You have a weakened disease-fighting (immune) system.  · You have nasal allergies or asthma.  · You are experiencing a lot of stress.  · You work in an area that has poor air circulation.  · You have poor  nutrition.  What are the signs or symptoms?  A URI usually involves some of the following symptoms:  · Runny or stuffy (congested) nose.  · Sneezing.  · Cough.  · Sore throat.  · Headache.  · Fatigue.  · Fever.  · Loss of appetite.  · Pain in your forehead, behind your eyes, and over your cheekbones (sinus pain).  · Muscle aches.  · Redness or irritation of the eyes.  · Pressure in the ears or face.  How is this diagnosed?  This condition may be diagnosed based on your medical history and symptoms, and a physical exam. Your health care provider may use a cotton swab to take a mucus sample from your nose (nasal swab). This sample can be tested to determine what virus is causing the illness.  How is this treated?  URIs usually get better on their own within 7-10 days. You can take steps at home to relieve your symptoms. Medicines cannot cure URIs, but your health care provider may recommend certain medicines to help relieve symptoms, such as:  · Over-the-counter cold medicines.  · Cough suppressants. Coughing is a type of defense against infection that helps to clear the respiratory system, so take these medicines only as recommended by your health care provider.  · Fever-reducing medicines.  Follow these instructions at home:  Activity  · Rest as needed.  · If you have a fever, stay home from work or school until your fever is gone or until your health care provider says you are no longer contagious. Your health care provider may have you wear a face mask to prevent your infection from spreading.  Relieving symptoms  · Gargle with a salt-water mixture 3-4 times a day or as needed. To make a salt-water mixture, completely dissolve ½-1 tsp of salt in 1 cup of warm water.  · Use a cool-mist humidifier to add moisture to the air. This can help you breathe more easily.  Eating and drinking    · Drink enough fluid to keep your urine pale yellow.  · Eat soups and other clear broths.  General instructions    · Take  over-the-counter and prescription medicines only as told by your health care provider. These include cold medicines, fever reducers, and cough suppressants.  · Do not use any products that contain nicotine or tobacco, such as cigarettes and e-cigarettes. If you need help quitting, ask your health care provider.  · Stay away from secondhand smoke.  · Stay up to date on all immunizations, including the yearly (annual) flu vaccine.  · Keep all follow-up visits as told by your health care provider. This is important.  How to prevent the spread of infection to others    · URIs can be passed from person to person (are contagious). To prevent the infection from spreading:  ? Wash your hands often with soap and water. If soap and water are not available, use hand .  ? Avoid touching your mouth, face, eyes, or nose.  ? Cough or sneeze into a tissue or your sleeve or elbow instead of into your hand or into the air.  Contact a health care provider if:  · You are getting worse instead of better.  · You have a fever or chills.  · Your mucus is brown or red.  · You have yellow or brown discharge coming from your nose.  · You have pain in your face, especially when you bend forward.  · You have swollen neck glands.  · You have pain while swallowing.  · You have white areas in the back of your throat.  Get help right away if:  · You have shortness of breath that gets worse.  · You have severe or persistent:  ? Headache.  ? Ear pain.  ? Sinus pain.  ? Chest pain.  · You have chronic lung disease along with any of the following:  ? Wheezing.  ? Prolonged cough.  ? Coughing up blood.  ? A change in your usual mucus.  · You have a stiff neck.  · You have changes in your:  ? Vision.  ? Hearing.  ? Thinking.  ? Mood.  Summary  · An upper respiratory infection (URI) is a common infection of the nose, throat, and upper air passages that lead to the lungs.  · A URI is caused by a virus.  · URIs usually get better on their own  within 7-10 days.  · Medicines cannot cure URIs, but your health care provider may recommend certain medicines to help relieve symptoms.  This information is not intended to replace advice given to you by your health care provider. Make sure you discuss any questions you have with your health care provider.  Document Released: 06/13/2002 Document Revised: 12/26/2019 Document Reviewed: 08/03/2018  [employer] Interactive Patient Education © 2020 [employer]

## 2020-04-21 ENCOUNTER — TELEPHONE (OUTPATIENT)
Dept: SURGERY | Facility: CLINIC | Age: 60
End: 2020-04-21

## 2020-04-21 NOTE — TELEPHONE ENCOUNTER
Patient's appt with Dr. Duque rescheduled due to COVID19  Monday 11/9/20 2:00.   I had to leave patient a message to call us back.

## 2020-06-18 RX ORDER — LISINOPRIL AND HYDROCHLOROTHIAZIDE 20; 12.5 MG/1; MG/1
TABLET ORAL
Qty: 90 TABLET | Refills: 0 | Status: SHIPPED | OUTPATIENT
Start: 2020-06-18 | End: 2020-07-14 | Stop reason: SDUPTHER

## 2020-07-14 ENCOUNTER — OFFICE VISIT (OUTPATIENT)
Dept: FAMILY MEDICINE CLINIC | Facility: CLINIC | Age: 60
End: 2020-07-14

## 2020-07-14 VITALS
OXYGEN SATURATION: 97 % | TEMPERATURE: 98.9 F | BODY MASS INDEX: 37.03 KG/M2 | DIASTOLIC BLOOD PRESSURE: 68 MMHG | HEIGHT: 66 IN | SYSTOLIC BLOOD PRESSURE: 130 MMHG | WEIGHT: 230.4 LBS | HEART RATE: 114 BPM

## 2020-07-14 DIAGNOSIS — R53.83 OTHER FATIGUE: ICD-10-CM

## 2020-07-14 DIAGNOSIS — G89.29 OTHER CHRONIC PAIN: Primary | ICD-10-CM

## 2020-07-14 DIAGNOSIS — I10 ESSENTIAL HYPERTENSION: ICD-10-CM

## 2020-07-14 DIAGNOSIS — G62.9 NEUROPATHY: ICD-10-CM

## 2020-07-14 DIAGNOSIS — R73.03 PREDIABETES: ICD-10-CM

## 2020-07-14 PROCEDURE — 99214 OFFICE O/P EST MOD 30 MIN: CPT | Performed by: NURSE PRACTITIONER

## 2020-07-14 RX ORDER — PREGABALIN 50 MG/1
50 CAPSULE ORAL 2 TIMES DAILY
Qty: 60 CAPSULE | Refills: 1 | Status: SHIPPED | OUTPATIENT
Start: 2020-07-14 | End: 2020-09-09 | Stop reason: DRUGHIGH

## 2020-07-14 RX ORDER — LISINOPRIL AND HYDROCHLOROTHIAZIDE 20; 12.5 MG/1; MG/1
1 TABLET ORAL DAILY
Qty: 90 TABLET | Refills: 1 | Status: SHIPPED | OUTPATIENT
Start: 2020-07-14 | End: 2021-03-18

## 2020-07-14 RX ORDER — SENNOSIDES 8.6 MG
1300 CAPSULE ORAL 2 TIMES DAILY
COMMUNITY

## 2020-07-14 NOTE — PROGRESS NOTES
Answers for HPI/ROS submitted by the patient on 7/8/2020   What is the primary reason for your visit?: Other  Please describe your symptoms.: All over body pain, especially lower extremities.  Feet, legs, shins, knees, hips, low back.  Joint, bone & muscle pain.  Rheumatologist has suggested possible fibromyalgia.  Anti-inflammatory not helping.  Have you had these symptoms before?: Yes  How long have you been having these symptoms?: Greater than 2 weeks  Subjective   Candie Arias is a 60 y.o. female.     Chief Complaint   Patient presents with   • Generalized Body Aches     PT STATES ALL OVER BODY PAIN, SAW RHEUMATOLOGIST- POSSIBLE FIBROMYALGIA   • Numbness     PT STATES IN FINGERS AND TOES, X 6 MONTHS       Pt is here for generalized pain.  Has been followed by rheumatology for last couple of yrs for chronic joint pain and recently been told that MD does not think that her joint pain is due to RA and that she would be best served by PCP for generalized pain.  Was told it could be fibromyalgia.  She has been taking celebrex which is about the only thing that helps her left knee pain s/p TKA with complications and her b/l foot pain.    She has begun having numbness and tingling in both her hands and feet and sometimes her legs as well. This is new in the last couple months.  She also has such terrible fatigue and chronic pain that she cannot vacuum her house.  Or if she goes to the grocery store that is all she can do for the day.  Could fall asleep and nap most anytime although she sleep well at night once she is able to fall asleep and does not have evidence of sleep apnea.  Thinks the insomnia is due more to feeling like she has to keep moving her legs and that she is just not able to get herself comfortable.   Foot pain has been followed by podiatry-has tried multiple orthotics without any relief as well as injections which really have not been helpful for very long.    Left knee pain:  Since TKA she had  quite a bit of PT which increased her ROM but after a while it began decreasing and she was not able to keep any improvement in this.  She thinks this probably contributes to her overall pain as it changes her gait.      HTN:  Has been controlled well on current dose of meds.  Has some mild chronic ankle edema which does not seem to be really improved on HCTZ.    Prediabetes:  Had been trying to manage weight and diet, but since she is not able to exercise and has a sweet tooth this has been difficult.  She had not returned for A1C follow up.       TINGLING IN FINGERS AND TOES    Social History     Tobacco Use   • Smoking status: Never Smoker   • Smokeless tobacco: Never Used   Substance Use Topics   • Alcohol use: Yes     Comment: MODERATE    • Drug use: No       The following portions of the patient's history were reviewed and updated as appropriate: allergies, current medications, past family history, past medical history, past social history, past surgical history and problem list.    Review of Systems   Constitutional: Positive for activity change, fatigue and unexpected weight change (recent weight gain). Negative for appetite change.   HENT: Negative for congestion and sore throat.    Respiratory: Negative for cough and shortness of breath.    Cardiovascular: Positive for leg swelling (b.l ankle edema). Negative for chest pain and palpitations.   Gastrointestinal: Negative for abdominal pain, blood in stool, constipation, diarrhea, nausea and vomiting.   Endocrine: Negative for cold intolerance, polydipsia and polyuria.   Musculoskeletal: Positive for arthralgias, gait problem and myalgias.   Skin: Negative for rash and wound.   Neurological: Positive for numbness. Negative for dizziness, weakness, light-headedness and headaches.   Hematological: Does not bruise/bleed easily.   Psychiatric/Behavioral: Positive for sleep disturbance. Negative for dysphoric mood. The patient is not nervous/anxious.   "      Objective   Blood pressure 130/68, pulse 114, temperature 98.9 °F (37.2 °C), temperature source Temporal, height 167.6 cm (65.98\"), weight 105 kg (230 lb 6.4 oz), SpO2 97 %, not currently breastfeeding.  Body mass index is 37.21 kg/m².    Physical Exam   Constitutional: She is oriented to person, place, and time. She appears well-developed and well-nourished. No distress.   HENT:   Head: Normocephalic and atraumatic.   Eyes: Conjunctivae are normal. Right eye exhibits no discharge. Left eye exhibits no discharge.   Neck: Neck supple. No thyromegaly present.   Cardiovascular: Normal rate and regular rhythm.   Trace b/l ankle edema L>R   Pulmonary/Chest: Effort normal and breath sounds normal.   Abdominal: Soft. Bowel sounds are normal. There is no tenderness.   Musculoskeletal: She exhibits no deformity.   Gait smooth and steady   Neurological: She is alert and oriented to person, place, and time.   Skin: Skin is warm and dry. She is not diaphoretic.   Psychiatric: She has a normal mood and affect.   Nursing note and vitals reviewed.      Assessment   Problem List Items Addressed This Visit        Cardiovascular and Mediastinum    Essential hypertension    Relevant Medications    lisinopril-hydrochlorothiazide (PRINZIDE,ZESTORETIC) 20-12.5 MG per tablet    Other Relevant Orders    TSH Rfx On Abnormal To Free T4    Comprehensive metabolic panel    Lipid Panel With LDL/HDL Ratio    CBC and Differential       Endocrine    Prediabetes      Other Visit Diagnoses     Other chronic pain    -  Primary    Relevant Medications    pregabalin (LYRICA) 50 MG capsule    Neuropathy        Relevant Medications    pregabalin (LYRICA) 50 MG capsule    Other Relevant Orders    Hemoglobin A1c    Vitamin B12    Other fatigue        Relevant Orders    Vitamin D 25 Hydroxy    CBC and Differential    Vitamin B12           Procedures           Impression and Plan:  We had a long discussion about the health risks of chronic celebrex and " side effects.  For now she will continue these.  She may have fibromyalgia.  Rheumtalogy notes and past labs reviewed with did not really show RA.    Will check labs-want to check her vitamin D and B12 for excessive fatigue and neuropathic sx.  Will also check TSH and A1C.    We discussed numerous conditions that can cause fatigue and chronic pain.      Today, after discussion about side effects will start low dose lyrica and I will see her back in 1 month to f/u.  Recommend low FODMAP diet to see if there are foods that may be increasing her fatigue or pain.      HTN:  BP seems to be controlled for the most part-will continue current meds.  Home monitoring periodically discussed.     Prediabetes:  Did not return for f/u-will get A1C today.  Suspect it will be increased due to weight gain.     Insomnia:  I think that has more to do with having trouble getting herself comfortable than true insomnia carrie as she reports she is well rested in the morning.  Will resolve this problem.    She will need annual when she returns.     Will get a controlled substance agreement today.  She will need Q3 month appts to continue this medicine.  Will get Terry.       Health Maintenance Due   Topic Date Due   • TDAP/TD VACCINES (1 - Tdap) 01/24/1970   • PAP SMEAR  10/06/2016   • COLONOSCOPY  10/06/2016   • ANNUAL PHYSICAL  06/25/2020              EMR Dragon/Transcription disclaimer:   Much of this encounter note is an electronic transcription/translation of spoken language to printed text. The electronic translation of spoken language may permit erroneous, or at times, nonsensical words or phrases to be inadvertently transcribed; Although I have reviewed the note for such errors, some may still exist.      The total time spent  was more than 25 min of which greater than 15 min of time (greater than 50% of the total time)  was spent face to face with the patient counseling and coordination of care on recommended evaluation and  treatment options, instructions for management/treatment and /or follow up and importance of compliance with chosen management or treatment options

## 2020-07-14 NOTE — PATIENT INSTRUCTIONS
Low-FODMAP Eating Plan    FODMAPs (fermentable oligosaccharides, disaccharides, monosaccharides, and polyols) are sugars that are hard for some people to digest. A low-FODMAP eating plan may help some people who have bowel (intestinal) diseases to manage their symptoms.  This meal plan can be complicated to follow. Work with a diet and nutrition specialist (dietitian) to make a low-FODMAP eating plan that is right for you. A dietitian can make sure that you get enough nutrition from this diet.  What are tips for following this plan?  Reading food labels  · Check labels for hidden FODMAPs such as:  ? High-fructose syrup.  ? Honey.  ? Agave.  ? Natural fruit flavors.  ? Onion or garlic powder.  · Choose low-FODMAP foods that contain 3-4 grams of fiber per serving.  · Check food labels for serving sizes. Eat only one serving at a time to make sure FODMAP levels stay low.  Meal planning  · Follow a low-FODMAP eating plan for up to 6 weeks, or as told by your health care provider or dietitian.  · To follow the eating plan:  1. Eliminate high-FODMAP foods from your diet completely.  2. Gradually reintroduce high-FODMAP foods into your diet one at a time. Most people should wait a few days after introducing one high-FODMAP food before they introduce the next high-FODMAP food. Your dietitian can recommend how quickly you may reintroduce foods.  3. Keep a daily record of what you eat and drink, and make note of any symptoms that you have after eating.  4. Review your daily record with a dietitian regularly. Your dietitian can help you identify which foods you can eat and which foods you should avoid.  General tips  · Drink enough fluid each day to keep your urine pale yellow.  · Avoid processed foods. These often have added sugar and may be high in FODMAPs.  · Avoid most dairy products, whole grains, and sweeteners.  · Work with a dietitian to make sure you get enough fiber in your diet.  Recommended  "foods  Grains  · Gluten-free grains, such as rice, oats, buckwheat, quinoa, corn, polenta, and millet. Gluten-free pasta, bread, or cereal. Rice noodles. Corn tortillas.  Vegetables  · Eggplant, zucchini, cucumber, peppers, green beans, [place] sprouts, bean sprouts, lettuce, arugula, kale, Swiss chard, spinach, kenya greens, bok jacky, summer squash, potato, and tomato. Limited amounts of corn, carrot, and sweet potato. Green parts of scallions.  Fruits  · Bananas, oranges, tyler, limes, blueberries, raspberries, strawberries, grapes, cantaloupe, honeydew melon, kiwi, papaya, passion fruit, and pineapple. Limited amounts of dried cranberries, banana chips, and shredded coconut.  Dairy  · Lactose-free milk, yogurt, and kefir. Lactose-free cottage cheese and ice cream. Non-dairy milks, such as almond, coconut, hemp, and rice milk. Yogurts made of non-dairy milks. Limited amounts of goat cheese, brie, mozzarella, parmesan, swiss, and other hard cheeses.  Meats and other protein foods  · Unseasoned beef, pork, poultry, or fish. Eggs. Fernández. Tofu (firm) and tempeh. Limited amounts of nuts and seeds, such as almonds, walnuts, brazil nuts, pecans, peanuts, pumpkin seeds, rahul seeds, and sunflower seeds.  Fats and oils  · Butter-free spreads. Vegetable oils, such as olive, canola, and sunflower oil.  Seasoning and other foods  · Artificial sweeteners with names that do not end in \"ol\" such as aspartame, saccharine, and stevia. Maple syrup, white table sugar, raw sugar, brown sugar, and molasses. Fresh basil, coriander, parsley, rosemary, and thyme.  Beverages  · Water and mineral water. Sugar-sweetened soft drinks. Small amounts of orange juice or cranberry juice. Black and green tea. Most dry piter. Coffee.  This may not be a complete list of low-FODMAP foods. Talk with your dietitian for more information.  Foods to avoid  Grains  · Wheat, including kamut, durum, and semolina. Barley and bulgur. Couscous. Wheat-based " cereals. Wheat noodles, bread, crackers, and pastries.  Vegetables  · Chicory root, artichoke, asparagus, cabbage, snow peas, sugar snap peas, mushrooms, and cauliflower. Onions, garlic, leeks, and the white part of scallions.  Fruits  · Fresh, dried, and juiced forms of apple, pear, watermelon, peach, plum, cherries, apricots, blackberries, boysenberries, figs, nectarines, and harshal. Avocado.  Dairy  · Milk, yogurt, ice cream, and soft cheese. Cream and sour cream. Milk-based sauces. Custard.  Meats and other protein foods  · Fried or fatty meat. Sausage. Cashews and pistachios. Soybeans, baked beans, black beans, chickpeas, kidney beans, mamadou beans, navy beans, lentils, and split peas.  Seasoning and other foods  · Any sugar-free gum or candy. Foods that contain artificial sweeteners such as sorbitol, mannitol, isomalt, or xylitol. Foods that contain honey, high-fructose corn syrup, or agave. Bouillon, vegetable stock, beef stock, and chicken stock. Garlic and onion powder. Condiments made with onion, such as hummus, chutney, pickles, relish, salad dressing, and salsa. Tomato paste.  Beverages  · Chicory-based drinks. Coffee substitutes. Chamomile tea. Fennel tea. Sweet or fortified piter such as port or moni. Diet soft drinks made with isomalt, mannitol, maltitol, sorbitol, or xylitol. Apple, pear, and harshal juice. Juices with high-fructose corn syrup.  This may not be a complete list of high-FODMAP foods. Talk with your dietitian to discuss what dietary choices are best for you.   Summary  · A low-FODMAP eating plan is a short-term diet that eliminates FODMAPs from your diet to help ease symptoms of certain bowel diseases.  · The eating plan usually lasts up to 6 weeks. After that, high-FODMAP foods are restarted gradually, one at a time, so you can find out which may be causing symptoms.  · A low-FODMAP eating plan can be complicated. It is best to work with a dietitian who has experience with this type of  plan.  This information is not intended to replace advice given to you by your health care provider. Make sure you discuss any questions you have with your health care provider.  Document Released: 08/14/2018 Document Revised: 11/30/2018 Document Reviewed: 08/14/2018  Elsevier Patient Education © 2020 [employer]

## 2020-07-15 LAB
25(OH)D3+25(OH)D2 SERPL-MCNC: 25.3 NG/ML (ref 30–100)
ALBUMIN SERPL-MCNC: 4.4 G/DL (ref 3.8–4.8)
ALBUMIN/GLOB SERPL: 1.9 {RATIO} (ref 1.2–2.2)
ALP SERPL-CCNC: 72 IU/L (ref 39–117)
ALT SERPL-CCNC: 19 IU/L (ref 0–32)
AST SERPL-CCNC: 19 IU/L (ref 0–40)
BASOPHILS # BLD AUTO: 0 X10E3/UL (ref 0–0.2)
BASOPHILS NFR BLD AUTO: 1 %
BILIRUB SERPL-MCNC: 0.3 MG/DL (ref 0–1.2)
BUN SERPL-MCNC: 23 MG/DL (ref 8–27)
BUN/CREAT SERPL: 34 (ref 12–28)
CALCIUM SERPL-MCNC: 10.1 MG/DL (ref 8.7–10.3)
CHLORIDE SERPL-SCNC: 105 MMOL/L (ref 96–106)
CHOLEST SERPL-MCNC: 219 MG/DL (ref 100–199)
CO2 SERPL-SCNC: 22 MMOL/L (ref 20–29)
CREAT SERPL-MCNC: 0.68 MG/DL (ref 0.57–1)
EOSINOPHIL # BLD AUTO: 0.1 X10E3/UL (ref 0–0.4)
EOSINOPHIL NFR BLD AUTO: 1 %
ERYTHROCYTE [DISTWIDTH] IN BLOOD BY AUTOMATED COUNT: 13 % (ref 11.7–15.4)
GLOBULIN SER CALC-MCNC: 2.3 G/DL (ref 1.5–4.5)
GLUCOSE SERPL-MCNC: 106 MG/DL (ref 65–99)
HBA1C MFR BLD: 6.4 % (ref 4.8–5.6)
HCT VFR BLD AUTO: 46.5 % (ref 34–46.6)
HDLC SERPL-MCNC: 56 MG/DL
HGB BLD-MCNC: 15.5 G/DL (ref 11.1–15.9)
IMM GRANULOCYTES # BLD AUTO: 0 X10E3/UL (ref 0–0.1)
IMM GRANULOCYTES NFR BLD AUTO: 1 %
LDLC SERPL CALC-MCNC: 112 MG/DL (ref 0–99)
LDLC/HDLC SERPL: 2 RATIO (ref 0–3.2)
LYMPHOCYTES # BLD AUTO: 1.3 X10E3/UL (ref 0.7–3.1)
LYMPHOCYTES NFR BLD AUTO: 16 %
MCH RBC QN AUTO: 29.6 PG (ref 26.6–33)
MCHC RBC AUTO-ENTMCNC: 33.3 G/DL (ref 31.5–35.7)
MCV RBC AUTO: 89 FL (ref 79–97)
MONOCYTES # BLD AUTO: 0.7 X10E3/UL (ref 0.1–0.9)
MONOCYTES NFR BLD AUTO: 9 %
NEUTROPHILS # BLD AUTO: 5.8 X10E3/UL (ref 1.4–7)
NEUTROPHILS NFR BLD AUTO: 72 %
PLATELET # BLD AUTO: 308 X10E3/UL (ref 150–450)
POTASSIUM SERPL-SCNC: 4.4 MMOL/L (ref 3.5–5.2)
PROT SERPL-MCNC: 6.7 G/DL (ref 6–8.5)
RBC # BLD AUTO: 5.24 X10E6/UL (ref 3.77–5.28)
SODIUM SERPL-SCNC: 142 MMOL/L (ref 134–144)
TRIGL SERPL-MCNC: 253 MG/DL (ref 0–149)
TSH SERPL DL<=0.005 MIU/L-ACNC: 1.26 UIU/ML (ref 0.45–4.5)
VIT B12 SERPL-MCNC: 217 PG/ML (ref 232–1245)
VLDLC SERPL CALC-MCNC: 51 MG/DL (ref 5–40)
WBC # BLD AUTO: 8 X10E3/UL (ref 3.4–10.8)

## 2020-07-16 ENCOUNTER — TELEPHONE (OUTPATIENT)
Dept: FAMILY MEDICINE CLINIC | Facility: CLINIC | Age: 60
End: 2020-07-16

## 2020-07-16 NOTE — TELEPHONE ENCOUNTER
SPOKE TO PT REGARDING LAB RESULTS. SHE EXPRESSED UNDERSTANDING. ALSO SCHEDULED HER A VIDEO VISIT TOMORROW MORNING.

## 2020-07-17 ENCOUNTER — TELEMEDICINE (OUTPATIENT)
Dept: FAMILY MEDICINE CLINIC | Facility: CLINIC | Age: 60
End: 2020-07-17

## 2020-07-17 DIAGNOSIS — R73.03 PREDIABETES: Primary | ICD-10-CM

## 2020-07-17 PROCEDURE — 99443 PR PHYS/QHP TELEPHONE EVALUATION 21-30 MIN: CPT | Performed by: NURSE PRACTITIONER

## 2020-07-17 NOTE — PROGRESS NOTES
Candie FELIPE Sherwood    1960  [id number]    Patient requested a video visit to discuss her most recent labs showing low vitamin B12 and D and worsening prediabetes as well as to discuss side effects of Lyrica she just started.     HPI she had a complete work-up recently for chronic pain and new neuropathic symptoms.  She also has a history of prediabetes and had not had an A1c done in a while.  She has had a chance to review her lab work which shows a very low vitamin B12, mildly low vitamin D, elevated lipid panel, and she is on the border of diabetes and prediabetes.     She eats eats a lot meat and eggs so surprised that her be 12 and vitamin D are low.  She does not remember having these checked in the past.  She does admit she has gained quite a bit of weight due to chronic pain, fatigue and COVID.  She is not sure that she can really exercise although it is her future goal.  She does admit she could do much better on her diet.  She does have quite a bit of a sweet tooth and is willing to figure out how to manage prediabetes.  She would like to think about whether or not she would like to start metformin.  Her  is a diabetic and they were following a pretty strict diabetic diet but that has fallen by the [place] with COVID in the last few months.  She does feel that he can resume this.  Hyperlipidemia: We discussed lower fat diet with plenty of healthy fats and reducing saturated fats and triglycerides in particular.  She would like to work on her diet before adding a new medication at this time.  She does admit that her chronic pain seems a little better since starting the Lyrica.  It did make her sleep better and she did not feel like her legs were restless.  She is concerned that it would make her sleepy during the day and not able to drive        Review of Systems - General ROS: negative for - chills or fever  Psychological ROS: negative for - anxiety or depression  Respiratory ROS: no cough,  shortness of breath, or wheezing  Cardiovascular ROS: no chest pain or dyspnea on exertion  Gastrointestinal ROS: no abdominal pain, change in bowel habits, or black or bloody stools  Musculoskeletal ROS: negative for - changes in her chronic pain.   Neurological ROS: negative for - dizziness, headaches, impaired coordination/balance or weakness    Physical:   Limited by video visit.  She is well appearing and does not seem to be distressed.  She seems alert and oriented and her mood and affect are normal, good historian of medical history.  No cough or dyspnea appreciated, able to complete sentences without problem.     Plan: We reviewed her blood work and treatment plan.  For her low vitamin D she will begin 2000 units daily and we will recheck in about 3 months.  For her low vitamin B12 I recommend a vitamin B12 injection monthly for about 3 months.  We will recheck her B 12 level in 3 months.  This is most likely causing at least some of her neuropathic symptoms.  She will also began taking a B complex.  For her prediabetes she will consider whether or not she wants to start metformin but for sure start on a diet plan.  She has started Lyrica for chronic pain and so far feels that it has reduced her pain possibly a little bit and definitely made her sleep better, she is concerned that it may make her too sleepy during the day to drive.  I did discuss with her that these side effects should begin to wear off in the next week or so.  She will let me know if they do not and we could consider moving her dose just to evening.  I answered all of her questions related to diabetes management plan and have encouraged her to see me any questions for follow-up my chart.  I do recommend getting an A1c in 3 months so that she gets feedback on whether or not her dietary plans are helpful.    There are no diagnoses linked to this encounter.    Any medications prescribed have been sent electronically to   [facility] -  [place], [place] - [address] [facility] - [phone]  - [phone] FX  [address] [facility] [address]  Phone: [phone] Fax: [phone]    [facility] - [place], [place] - [facility] - [phone]  - [phone] [address]  [address]  [facility] [address]  Phone: [phone] Fax: [phone]      Ester Ignacio, APRN  07/17/20  10:33 AM    Patient gave consent today for a telehealth video visit as following recommendations of our governor and CDC during the COVID-19 pandemic.    20 min was spent in discussion with pt and greater than 50% of that time was spent counseling.      Zoom platform used with good A/V quality.

## 2020-07-20 ENCOUNTER — CLINICAL SUPPORT (OUTPATIENT)
Dept: FAMILY MEDICINE CLINIC | Facility: CLINIC | Age: 60
End: 2020-07-20

## 2020-07-20 DIAGNOSIS — E53.8 B12 DEFICIENCY: Primary | ICD-10-CM

## 2020-07-20 PROBLEM — H65.02 ACUTE SEROUS OTITIS MEDIA OF LEFT EAR: Status: RESOLVED | Noted: 2017-01-30 | Resolved: 2020-07-20

## 2020-07-20 PROBLEM — R73.03 PREDIABETES: Status: ACTIVE | Noted: 2020-07-20

## 2020-07-20 PROBLEM — R42 VERTIGO: Status: RESOLVED | Noted: 2017-01-30 | Resolved: 2020-07-20

## 2020-07-20 PROCEDURE — 96372 THER/PROPH/DIAG INJ SC/IM: CPT | Performed by: NURSE PRACTITIONER

## 2020-07-20 RX ORDER — CYANOCOBALAMIN 1000 UG/ML
1000 INJECTION, SOLUTION INTRAMUSCULAR; SUBCUTANEOUS
Status: DISCONTINUED | OUTPATIENT
Start: 2020-07-20 | End: 2020-10-13

## 2020-07-20 RX ADMIN — CYANOCOBALAMIN 1000 MCG: 1000 INJECTION, SOLUTION INTRAMUSCULAR; SUBCUTANEOUS at 09:41

## 2020-08-19 ENCOUNTER — CLINICAL SUPPORT (OUTPATIENT)
Dept: FAMILY MEDICINE CLINIC | Facility: CLINIC | Age: 60
End: 2020-08-19

## 2020-08-19 DIAGNOSIS — E53.8 B12 DEFICIENCY: ICD-10-CM

## 2020-08-19 PROCEDURE — 96372 THER/PROPH/DIAG INJ SC/IM: CPT | Performed by: NURSE PRACTITIONER

## 2020-08-19 RX ADMIN — CYANOCOBALAMIN 1000 MCG: 1000 INJECTION, SOLUTION INTRAMUSCULAR; SUBCUTANEOUS at 10:36

## 2020-09-08 DIAGNOSIS — G62.9 NEUROPATHY: Primary | ICD-10-CM

## 2020-09-09 DIAGNOSIS — G62.9 NEUROPATHY: Primary | ICD-10-CM

## 2020-09-09 RX ORDER — CELECOXIB 200 MG/1
200 CAPSULE ORAL DAILY
Qty: 30 CAPSULE | Refills: 3 | Status: SHIPPED | OUTPATIENT
Start: 2020-09-09 | End: 2020-10-20

## 2020-09-09 RX ORDER — PREGABALIN 75 MG/1
75 CAPSULE ORAL 2 TIMES DAILY
Qty: 60 CAPSULE | Refills: 0 | Status: SHIPPED | OUTPATIENT
Start: 2020-09-09 | End: 2020-10-19

## 2020-09-18 ENCOUNTER — CLINICAL SUPPORT (OUTPATIENT)
Dept: FAMILY MEDICINE CLINIC | Facility: CLINIC | Age: 60
End: 2020-09-18

## 2020-09-18 PROCEDURE — 96372 THER/PROPH/DIAG INJ SC/IM: CPT | Performed by: NURSE PRACTITIONER

## 2020-09-18 RX ADMIN — CYANOCOBALAMIN 1000 MCG: 1000 INJECTION, SOLUTION INTRAMUSCULAR; SUBCUTANEOUS at 10:21

## 2020-10-06 ENCOUNTER — OFFICE VISIT (OUTPATIENT)
Dept: NEUROLOGY | Facility: CLINIC | Age: 60
End: 2020-10-06

## 2020-10-06 VITALS
BODY MASS INDEX: 37.93 KG/M2 | DIASTOLIC BLOOD PRESSURE: 76 MMHG | WEIGHT: 236 LBS | HEIGHT: 66 IN | OXYGEN SATURATION: 98 % | SYSTOLIC BLOOD PRESSURE: 136 MMHG | HEART RATE: 113 BPM

## 2020-10-06 DIAGNOSIS — E53.8 B12 DEFICIENCY: Primary | ICD-10-CM

## 2020-10-06 PROCEDURE — 99204 OFFICE O/P NEW MOD 45 MIN: CPT | Performed by: PSYCHIATRY & NEUROLOGY

## 2020-10-06 PROCEDURE — 96372 THER/PROPH/DIAG INJ SC/IM: CPT | Performed by: PSYCHIATRY & NEUROLOGY

## 2020-10-06 RX ORDER — CYANOCOBALAMIN 1000 UG/ML
1000 INJECTION, SOLUTION INTRAMUSCULAR; SUBCUTANEOUS ONCE
Status: COMPLETED | OUTPATIENT
Start: 2020-10-06 | End: 2020-10-06

## 2020-10-06 RX ADMIN — CYANOCOBALAMIN 1000 MCG: 1000 INJECTION, SOLUTION INTRAMUSCULAR; SUBCUTANEOUS at 17:00

## 2020-10-06 NOTE — PROGRESS NOTES
Chief Complaint   Patient presents with   • Peripheral Neuropathy       Patient ID: Candie Arias is a 60 y.o. female.    HPI: I have had the pleasure of seeing your patient today.  As you may know she is a 60-year-old female with history of a knee replacement that went bad back in 2014.  She says that she had several surgeries on the knee and afterwards began to experience significant swelling and pain.  She started to have swelling and pain another extremities.  She saw a rheumatologist which performed a work-up for possible rheumatoid arthritis however that was negative.  She says that for several years she has had burning and tingling in her toes and lower legs.  She says that she experiences shooting sensations as well as pins and needlelike sensations.  She says that it is worse when she is on her feet.  It has worked its way up to her shins.  She can even feel some of the sensations in her hips bilaterally.  The severity of the symptoms come and go however the symptoms are always present to some extent.  They seem to be worse at night.  She has noted some tingling in her arms as well as her hands.  No symptoms in the face.  She denies any chronic neck pain.  No bowel or bladder symptoms.  No focal weakness.  She is on Lyrica which has helped some however not significantly at this point.  Of note vitamin B12 checked recently on 714 of 2020 was 217.    The following portions of the patient's history were reviewed and updated as appropriate: allergies, current medications, past family history, past medical history, past social history, past surgical history and problem list.    Review of Systems   Constitutional: Positive for fatigue. Negative for chills and fever.   HENT: Positive for hearing loss. Negative for tinnitus and trouble swallowing.    Eyes: Negative for pain, redness and itching.   Respiratory: Positive for cough. Negative for shortness of breath and wheezing.    Cardiovascular: Positive for leg  swelling (both ). Negative for chest pain and palpitations.   Gastrointestinal: Negative for diarrhea, nausea and vomiting.   Endocrine: Positive for heat intolerance. Negative for cold intolerance and polydipsia.   Genitourinary: Negative for decreased urine volume, difficulty urinating and urgency.   Musculoskeletal: Positive for back pain, gait problem (no falls) and joint swelling.   Allergic/Immunologic: Positive for environmental allergies. Negative for food allergies and immunocompromised state.   Neurological: Positive for weakness and numbness. Negative for dizziness, tremors, seizures, syncope, facial asymmetry, speech difficulty, light-headedness and headaches.   Hematological: Negative for adenopathy. Bruises/bleeds easily.   Psychiatric/Behavioral: Positive for sleep disturbance. Negative for confusion. The patient is not nervous/anxious.       I have reviewed the review of systems above performed by my medical assistant.      Vitals:    10/06/20 1542   BP: 136/76   Pulse: 113   SpO2: 98%       Neurologic Exam     Mental Status   Oriented to person, place, and time.   Registration: recalls 3 of 3 objects. Follows 3 step commands.   Attention: normal. Concentration: normal.   Speech: speech is normal   Level of consciousness: alert  Knowledge: consistent with education (No deficits found.).   Normal comprehension.     Cranial Nerves     CN II   Visual fields full to confrontation.     CN III, IV, VI   Pupils are equal, round, and reactive to light.  Extraocular motions are normal.   CN III: no CN III palsy  CN VI: no CN VI palsy  Nystagmus: none   Diplopia: none    CN V   Facial sensation intact.     CN VII   Facial expression full, symmetric.     CN VIII   CN VIII normal.     CN IX, X   CN IX normal.   CN X normal.     CN XI   CN XI normal.     CN XII   CN XII normal.     Motor Exam   Muscle bulk: normal  Right arm tone: normal  Left arm tone: normal  Right leg tone: normal  Left leg tone:  normal    Strength   Right neck flexion: 5/5  Left neck flexion: 5/5  Right neck extension: 5/5  Left neck extension: 5/5  Right deltoid: 5/5  Left deltoid: 5/5  Right biceps: 5/5  Left biceps: 5/5  Right triceps: 5/5  Left triceps: 5/5  Right wrist flexion: 5/5  Left wrist flexion: 5/5  Right wrist extension: 5/5  Left wrist extension: 5/5  Right interossei: 5/5  Left interossei: 5/5  Right abdominals: 5/5  Left abdominals: 5/5  Right iliopsoas: 5/5  Left iliopsoas: 5/5  Right quadriceps: 5/5  Left quadriceps: 5/5  Right hamstrin/5  Left hamstrin/5  Right glutei: 5/5  Left glutei: 5/5  Right anterior tibial: 5/5  Left anterior tibial: 5/5  Right posterior tibial: 5/5  Left posterior tibial: 5/5  Right peroneal: 5/5  Left peroneal: 5/5  Right gastroc: 5/5  Left gastroc: 5/5    Sensory Exam   Light touch normal.   Vibration normal.   Proprioception normal.   Pinprick normal.     Gait, Coordination, and Reflexes     Gait  Gait: normal    Coordination   Romberg: negative    Tremor   Resting tremor: absent  Intention tremor: absent    Reflexes   Right brachioradialis: 2+  Left brachioradialis: 2+  Right biceps: 2+  Left biceps: 2+  Right triceps: 2+  Left triceps: 2+  Right patellar: 2+  Left patellar: 2+  Right achilles: 2+  Left achilles: 2+  Right : 2+  Left : 2+Station is normal.       Physical Exam  Vitals signs reviewed.   Constitutional:       General: She is not in acute distress.     Appearance: She is well-developed.   HENT:      Head: Normocephalic and atraumatic.   Eyes:      Extraocular Movements: EOM normal.      Pupils: Pupils are equal, round, and reactive to light.   Neck:      Musculoskeletal: Normal range of motion.   Cardiovascular:      Rate and Rhythm: Normal rate and regular rhythm.      Heart sounds: Normal heart sounds.   Pulmonary:      Effort: Pulmonary effort is normal. No respiratory distress.      Breath sounds: Normal breath sounds.   Abdominal:      General: Bowel sounds  are normal. There is no distension.      Palpations: Abdomen is soft.      Tenderness: There is no abdominal tenderness.   Musculoskeletal:         General: No deformity.   Skin:     General: Skin is warm.      Findings: No rash.   Neurological:      Mental Status: She is oriented to person, place, and time.      Coordination: Romberg Test normal.      Gait: Gait is intact.      Deep Tendon Reflexes:      Reflex Scores:       Tricep reflexes are 2+ on the right side and 2+ on the left side.       Bicep reflexes are 2+ on the right side and 2+ on the left side.       Brachioradialis reflexes are 2+ on the right side and 2+ on the left side.       Patellar reflexes are 2+ on the right side and 2+ on the left side.       Achilles reflexes are 2+ on the right side and 2+ on the left side.  Psychiatric:         Speech: Speech normal.         Judgment: Judgment normal.         Procedures    Assessment/Plan: She has acknowledge some fatigue and memory issues as well.  The constellation of symptoms is very telling for vitamin B12 deficiency which she does suffer from.  We are going to start her on a vitamin B12 injection protocol here at the clinic which would be 1 injection/week for 4 weeks then 1 injection every other week for 4 months.  She will go a month without injections and have a follow-up level.  We will see her back here in the clinic in 5 months or sooner if needed.       Candie was seen today for peripheral neuropathy.    Diagnoses and all orders for this visit:    B12 deficiency  -     cyanocobalamin injection 1,000 mcg           Sunny Irizarry II, MD

## 2020-10-13 ENCOUNTER — CLINICAL SUPPORT (OUTPATIENT)
Dept: NEUROLOGY | Facility: CLINIC | Age: 60
End: 2020-10-13

## 2020-10-13 DIAGNOSIS — E53.8 B12 DEFICIENCY: Primary | ICD-10-CM

## 2020-10-13 PROCEDURE — 96372 THER/PROPH/DIAG INJ SC/IM: CPT | Performed by: PSYCHIATRY & NEUROLOGY

## 2020-10-13 RX ORDER — CYANOCOBALAMIN 1000 UG/ML
1000 INJECTION, SOLUTION INTRAMUSCULAR; SUBCUTANEOUS ONCE
Status: COMPLETED | OUTPATIENT
Start: 2020-10-13 | End: 2020-10-13

## 2020-10-13 RX ADMIN — CYANOCOBALAMIN 1000 MCG: 1000 INJECTION, SOLUTION INTRAMUSCULAR; SUBCUTANEOUS at 11:30

## 2020-10-19 ENCOUNTER — OFFICE VISIT (OUTPATIENT)
Dept: FAMILY MEDICINE CLINIC | Facility: CLINIC | Age: 60
End: 2020-10-19

## 2020-10-19 VITALS
HEIGHT: 66 IN | OXYGEN SATURATION: 96 % | RESPIRATION RATE: 16 BRPM | DIASTOLIC BLOOD PRESSURE: 84 MMHG | WEIGHT: 238.6 LBS | TEMPERATURE: 98.4 F | HEART RATE: 108 BPM | BODY MASS INDEX: 38.35 KG/M2 | SYSTOLIC BLOOD PRESSURE: 146 MMHG

## 2020-10-19 DIAGNOSIS — R53.83 OTHER FATIGUE: ICD-10-CM

## 2020-10-19 DIAGNOSIS — R73.03 PREDIABETES: ICD-10-CM

## 2020-10-19 DIAGNOSIS — M25.50 POLYARTHRALGIA: ICD-10-CM

## 2020-10-19 DIAGNOSIS — G62.9 NEUROPATHY: Primary | ICD-10-CM

## 2020-10-19 DIAGNOSIS — E53.8 B12 DEFICIENCY: ICD-10-CM

## 2020-10-19 DIAGNOSIS — R25.3 EYELID TWITCH: ICD-10-CM

## 2020-10-19 PROCEDURE — 99214 OFFICE O/P EST MOD 30 MIN: CPT | Performed by: NURSE PRACTITIONER

## 2020-10-19 NOTE — PROGRESS NOTES
Subjective  Answers for HPI/ROS submitted by the patient on 10/18/2020   What is the primary reason for your visit?: Other  Please describe your symptoms.: Follow-up.  All over body pain, tingling in hand & feet, fatigue.  Vitamin B12 & D deficiencies.  Have you had these symptoms before?: Yes  How long have you been having these symptoms?: Greater than 2 weeks    Candie Arias is a 60 y.o. female.     Chief Complaint   Patient presents with   • Knee Pain     3 mo FU      HPI patient is here for 3-month follow-up on chronic neuropathic pain in hands and feet, fatigue, bilateral knee pain due to OA particularly left knee.    Patient reports she was able to get into neurology sooner with cancellation.  She has been diagnosed with vitamin B deficiency and is now on aggressive therapy with weekly vitamin B injections followed by monthly injections and then a recheck of her vitamin B.  This is all being managed by neuro.  She reports that neurologist do think her fatigue and brain fog and neuropathic pain is due to a B12 deficiency.    She did not really get any benefit from the Lyrica and has been off of it for about 2 weeks now.  She does not think she needs a refill on it for now.    Knee pain is worse since cutting back on her Celebrex to 200 mg.-making it difficult to sleep at night.  She struggles to do many ADLs due to her knee pain and left knee immobility following replacement.    Taking 2000IU vitamin D daily as recommended for low vitamin D at last visit.    She reports that she realize she has a family history of acromegaly.  Her mother had this condition and patient was doing some research and is wondering if she could also have this.  She admits hot flashes ongoing but do not seem menopausal.  She also reports that her foot size has changed from a 7 to a 9 over the last several years.    Eye twitching: She has had right eye twitching for about the last couple weeks.  This has been new since starting the  "B12.  She has never had it quite like this in the past.  She is wondering what could be causing this.  She denies any vision changes or eye pain.    Has been taking and tolerating her metformin that we started for new diagnosis of prediabetes.  She is due for an A1c today.  She has been trying to work on dietary modifications but does struggle with this due to always feeling fatigued and craving carbohydrates.  She also has limited activity due to fatigue and chronic pain.    Social History     Tobacco Use   • Smoking status: Never Smoker   • Smokeless tobacco: Never Used   Substance Use Topics   • Alcohol use: Yes     Comment: MODERATE    • Drug use: No       The following portions of the patient's history were reviewed and updated as appropriate: allergies, current medications, past family history, past medical history, past social history, past surgical history and problem list.    Review of Systems   Constitutional: Positive for fatigue. Negative for activity change, appetite change and unexpected weight change.   HENT: Negative for congestion.    Eyes: Negative for photophobia, pain, discharge, redness, itching and visual disturbance.   Respiratory: Negative for cough and shortness of breath.    Cardiovascular: Negative for chest pain, palpitations and leg swelling.   Gastrointestinal: Negative for abdominal pain, blood in stool, constipation, diarrhea, nausea and vomiting.   Endocrine: Negative for polydipsia and polyuria.   Musculoskeletal: Positive for arthralgias, gait problem, joint swelling and myalgias.   Neurological: Positive for numbness. Negative for dizziness, weakness and headaches.       Objective   Blood pressure 146/84, pulse 108, temperature 98.4 °F (36.9 °C), resp. rate 16, height 167.6 cm (66\"), weight 108 kg (238 lb 9.6 oz), SpO2 96 %, not currently breastfeeding.  Body mass index is 38.51 kg/m².    Physical Exam  Vitals signs and nursing note reviewed.   Constitutional:       General: She " is not in acute distress.     Appearance: She is well-developed. She is obese. She is not ill-appearing or diaphoretic.   HENT:      Head: Normocephalic and atraumatic.      Right Ear: Tympanic membrane normal.      Left Ear: Tympanic membrane normal.      Mouth/Throat:      Mouth: Mucous membranes are moist.      Pharynx: Oropharynx is clear.   Eyes:      General: No scleral icterus.        Right eye: No discharge.         Left eye: No discharge.      Extraocular Movements: Extraocular movements intact.      Conjunctiva/sclera: Conjunctivae normal.      Pupils: Pupils are equal, round, and reactive to light.   Cardiovascular:      Rate and Rhythm: Normal rate and regular rhythm.      Heart sounds: Normal heart sounds.   Pulmonary:      Effort: Pulmonary effort is normal.      Breath sounds: Normal breath sounds.   Abdominal:      General: Bowel sounds are normal.      Palpations: Abdomen is soft.      Tenderness: There is no abdominal tenderness.   Musculoskeletal:         General: No deformity.      Comments: Gait smooth and steady   Skin:     General: Skin is warm and dry.   Neurological:      General: No focal deficit present.      Mental Status: She is alert and oriented to person, place, and time.      Motor: No weakness.   Psychiatric:         Mood and Affect: Mood normal.         Speech: Speech normal.         Behavior: Behavior is cooperative.         Assessment   Problem List Items Addressed This Visit        Endocrine    Prediabetes    Relevant Orders    Hemoglobin A1c (Completed)      Other Visit Diagnoses     Neuropathy    -  Primary    B12 deficiency        Other fatigue        Eyelid twitch        Relevant Orders    Magnesium (Completed)           Procedures           Impression and Plan: Reviewed her neurology note and assessment.  Discussed with patient.  We will go ahead and stop the Lyrica.  I spent a lot of time counseling her on expected improvement in fatigue and neuropathic sx.      We will go  ahead and have her increase her Celebrex to 2 tabs daily and cautioned to make sure she takes always with food. New Rx sent.    Prediabetes:  She has been taking and barby her metformin.  Not able to exercise.  Tries to control diet.  Will get A1C today.     Will check Mg for new eye twitch.  If it does not resolve may need further w/u.      Will consider acromegaly work up which we discussed.      Health Maintenance Due   Topic Date Due   • COLONOSCOPY  1960   • TDAP/TD VACCINES (1 - Tdap) 01/24/1978   • PAP SMEAR  10/06/2016   • ANNUAL PHYSICAL  06/25/2020   • INFLUENZA VACCINE  08/01/2020       The total time spent  was more than 25 min of which greater than 15 min of time (greater than 50% of the total time)  was spent face to face with the patient counseling and treatment options, instructions for management/treatment and /or follow up and importance of compliance with chosen management or treatment options         EMR Dragon/Transcription disclaimer:   Much of this encounter note is an electronic transcription/translation of spoken language to printed text. The electronic translation of spoken language may permit erroneous, or at times, nonsensical words or phrases to be inadvertently transcribed; Although I have reviewed the note for such errors, some may still exist.

## 2020-10-20 LAB
HBA1C MFR BLD: 6.3 % (ref 4.8–5.6)
MAGNESIUM SERPL-MCNC: 2 MG/DL (ref 1.6–2.4)

## 2020-10-20 RX ORDER — CELECOXIB 200 MG/1
200 CAPSULE ORAL 2 TIMES DAILY PRN
Qty: 60 CAPSULE | Refills: 3 | Status: SHIPPED | OUTPATIENT
Start: 2020-10-20 | End: 2021-02-17

## 2020-10-21 ENCOUNTER — CLINICAL SUPPORT (OUTPATIENT)
Dept: NEUROLOGY | Facility: CLINIC | Age: 60
End: 2020-10-21

## 2020-10-21 DIAGNOSIS — E53.8 B12 DEFICIENCY: Primary | ICD-10-CM

## 2020-10-21 PROCEDURE — 96372 THER/PROPH/DIAG INJ SC/IM: CPT | Performed by: PSYCHIATRY & NEUROLOGY

## 2020-10-21 RX ORDER — CYANOCOBALAMIN 1000 UG/ML
1000 INJECTION, SOLUTION INTRAMUSCULAR; SUBCUTANEOUS ONCE
Status: COMPLETED | OUTPATIENT
Start: 2020-10-21 | End: 2020-10-21

## 2020-10-21 RX ADMIN — CYANOCOBALAMIN 1000 MCG: 1000 INJECTION, SOLUTION INTRAMUSCULAR; SUBCUTANEOUS at 11:27

## 2020-10-28 ENCOUNTER — CLINICAL SUPPORT (OUTPATIENT)
Dept: NEUROLOGY | Facility: CLINIC | Age: 60
End: 2020-10-28

## 2020-10-28 DIAGNOSIS — E53.8 B12 DEFICIENCY: Primary | ICD-10-CM

## 2020-10-28 PROCEDURE — 96372 THER/PROPH/DIAG INJ SC/IM: CPT | Performed by: PSYCHIATRY & NEUROLOGY

## 2020-10-28 RX ORDER — CYANOCOBALAMIN 1000 UG/ML
1000 INJECTION, SOLUTION INTRAMUSCULAR; SUBCUTANEOUS ONCE
Status: COMPLETED | OUTPATIENT
Start: 2020-10-28 | End: 2020-10-28

## 2020-10-28 RX ADMIN — CYANOCOBALAMIN 1000 MCG: 1000 INJECTION, SOLUTION INTRAMUSCULAR; SUBCUTANEOUS at 10:57

## 2020-11-04 ENCOUNTER — TELEPHONE (OUTPATIENT)
Dept: SURGERY | Facility: CLINIC | Age: 60
End: 2020-11-04

## 2020-11-04 NOTE — TELEPHONE ENCOUNTER
Patient's appt changed from 1:45 to 1:30 Monday 11/9/20. I have left patient a message to confirm.

## 2020-11-09 ENCOUNTER — TELEPHONE (OUTPATIENT)
Dept: SURGERY | Facility: CLINIC | Age: 60
End: 2020-11-09

## 2020-11-09 ENCOUNTER — OFFICE VISIT (OUTPATIENT)
Dept: SURGERY | Facility: CLINIC | Age: 60
End: 2020-11-09

## 2020-11-09 ENCOUNTER — TELEPHONE (OUTPATIENT)
Dept: FAMILY MEDICINE CLINIC | Facility: CLINIC | Age: 60
End: 2020-11-09

## 2020-11-09 VITALS
TEMPERATURE: 97.7 F | OXYGEN SATURATION: 97 % | HEART RATE: 125 BPM | HEIGHT: 66 IN | DIASTOLIC BLOOD PRESSURE: 81 MMHG | WEIGHT: 237 LBS | BODY MASS INDEX: 38.09 KG/M2 | SYSTOLIC BLOOD PRESSURE: 124 MMHG

## 2020-11-09 DIAGNOSIS — D05.11 DUCTAL CARCINOMA IN SITU (DCIS) OF RIGHT BREAST: Primary | ICD-10-CM

## 2020-11-09 DIAGNOSIS — N60.99 ATYPICAL LOBULAR HYPERPLASIA (ALH) OF BREAST: ICD-10-CM

## 2020-11-09 DIAGNOSIS — R00.0 TACHYCARDIA: ICD-10-CM

## 2020-11-09 DIAGNOSIS — N60.99 ATYPICAL DUCTAL HYPERPLASIA OF BREAST: ICD-10-CM

## 2020-11-09 DIAGNOSIS — Z80.3 FH: BREAST CANCER: ICD-10-CM

## 2020-11-09 PROCEDURE — 99213 OFFICE O/P EST LOW 20 MIN: CPT | Performed by: SURGERY

## 2020-11-09 NOTE — PROGRESS NOTES
Chief Complaint: Candie Arias is a 60 y.o. female who was seen in consultation at the request of Jose Dorsey II, MD  for Breast cancer and a followup visit    History of Present Illness:  The patient comes today  reporting : abnormal LEFT breast imaging and biopsy report.   She had not noted any masses, skin changes, nipple changes, nipple discharge either breast at the time of her most recent imaging.  She had the below imaging and was found to have LEFT breast atypical hyperplasia bordering on DCIS, as well as ALH.  Her most recent imaging includes the followin-21-12 Screening mammogram [facility] Hugo Schreiber  There are scattered fibroglandular densities.   Finding 1: There are several punctate and fine granular calcifications with grouped distribution seen in the middle one-third upper outer region of the left breast. There is an increased number of calcifications.   Finding 2: Stable isodense, oval mass measuring 11 millimeters lower inner region of the left breast.   Finding 3: Stable isodense, oval mass measuring 11 millimeteres upper outer region of the right breast. Note is made of a biopsy clip as evidence of a previous surgical procedure.   Finding 4: Punctate and spherical calcifications seen in both breasts.   Impression:   Finding 1: Calcifications in the left breast require additional evaluation.   Finding 2: Benign-Negative.   Finding 3: Benign-Negative.   Finding 4: Benign-Negative.   BI-RADS Category 0    04-10-12 Left breast mammography [facility]  Impression:   Calcifications in the left breats are suspicious, stereotactic biopsy is recommended.   BIRADS Category 4B    Her most recent imaging prior to the above was: in     She then had the following biopsy:    12 Stereotactic biopsy [facility]  12 core needle biopsy specimens. 9 gauge vacuum assisted device. Marker clip was deployed.     12 Pathology results [facility]  Diagnosis:    Left breast upper outer quadrant.  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   Atypical lobular hyperplasia (ALH) is also noted.   Surounding breast parenchyma demonstrates pseudoangiomatous stromal hyperplasia, columnar cell hyperplasia, apocrine change, and fibrocystic changes.     She had a benign RIGHT breast biopsy in the past.  She is postmenopausal, has her uterus and ovaries and takes no hormones.   She has 4 sisters, 2 maternal aunts, 3 paternal aunts, one son. A niece  (her sister's daughter) had breast cancer diagnosed at age 38. That same sister had a son who  from testicular cancer in his 20s. No other breast and no ovarian cancer in her family.    Since our last visit, Mrs. Arias had her RIGHT breast MRI biopsy 12, which returned as benign. She has healed with some ecchymoses RIGHT breast.  The pathology returned as benign tissue with CCC and FCC.  She has seen Dr Kirkpatrick and he has sent a full series of labs for hypercoagulability- she has followup with him next week. Her cousin has had BRCA testing and was BRCA negative.     We excised her ADH/DCIS on 12. She has done well since that procedure with no complaints. Her pathology returned as additional ADH and ALH, no DCIS. margins clear. I did send her core biopsy pathology for review at [place] to determine whether any DCIS present. The report showed atypical hyperplasia only.    SInce our last visit, Mrs. Arias has done well.  She has noted no changes in her breast exam. No new masses, skin changes, nipple changes, nipple discharge either breast.   She started tamoxifen 12 Dr. Sexton and is tolerating this.  She was seen by Dr. Mederos and felt not to need radiation based on pathology review.    She had 2 areas of SCC removed from her nose, Dr. Abrams in .  Her most  recent imaging includes the followin/22/13       Bilateral Diagnostic Mammogram       [facility]       Candie Arias  There are scattered fibroglandular densities.   Finding 1: Follow-up calcifications in the left breast does not persist.   Finding 2: Follow-up isodense, oval mass measuring 22 millimeters with circumscribed margins in the lower inner region of the left breast has decreased in size.   Finding 3: Multiple stable punctate and spherical lucent-centered calcifications seen in both breasts.   IMPRESSION:  Finding 1: Benign-Negative  Finding 2: Benign-Negative   Finding 3: Benign-Negative   BI-RADS Category 2: benign     In the interim, Mrs. Arias has done well. She has noted no changes in her breast exam- no new masses, skin changes, niplpechanges, nipple discharge either breast.  She has gained 17# and weighs 215 today.    SHe is having knee trouble.  She continues the tamoxifen and sees Dr Sexton for followup.  Her most recent imaging includes 3-2014 MRI at [facility] and mammogram at [facility] that are both BR2, see below.    Mrs. Arias's MRI 3-27-15 showed  LEFT breast 3:00 post op architectural change.  RIGHT breast showed anterior third 11:30 linear enhancement, borderline clumped, 6.4 cm AP x 2.8 cm sup to inf x 1.4 cm med to lat- rec biopsy, stereo if mammo correlate  No Right adenopathy.   On mammogram at [facility] there are new calcifications linearly distributed middle third UOQ, rec additional imaging.  Additional views showed 7-8 cm in the AP dimension of indeterminate calcifications that correlated with the MRI findings. Rec stereo    Stereo returned as DCIS, high grade, with comedo necrosis, at least 3mm, with calcifications.     She is here to rview and discuss treatment.    Recently, she has had the following occurrences:  1- LEFT knee replacement Dr Barney- postop infection, replacement of prosthesis and home antibiotics- either rocephin or vanco and she had a bad reaction. Dr Barney has since retired  and she will be seeing Dr. He.  2- Seen in 2013 for tachycardia by cardiology. Perioperatively had persistnet tachycardia per her report.  Saw Dr Moreira for this and he had her get a cardiac HENRY that was negative per her report.    Her review of systems is unchanged other than  the above since 4-4-14.  I have reviewed the patient's Past Medical History, Family History, Social History, medications and allergies and confirm that the information is up to date and accurate.      10-22-14- Saw a NP in Dr Roberts office and Dx with atrial bigenimy and tachycardia. Her free T4 was low at 0.76 and they rec FU with Dr Moreira  They then recommended Holter monitor. Normal 2D echo noted in 7-2010 after her dx atrial bigeminy.  Did not feel that chest pressure was characteristic to merit a stress test.    Dr Sexton called and stated that she did not need any additional prophylaxis for the MTHFR mutation    FH corrected- her neice who had breast cancer had carcinoid not ovarian cancer.    - Called Dr. He office- she had a drug induced fever, related to Rocephin not vanocmycin.    Dr Shearer stated:  stress scho with normal EF 60%, o/w normal, hx tachycardia and atrial bigenimy that is stable. She is low risk and since echo is normal can proceed.    Her genetics pandel returned as negative.    We went to the operating room on 5-20-15 for bilateral mastectomy and RIGHT SLNB returned as 3.5+ cm of multifocal DCIS with pagets of the nipple. DCIS present in UOQ, UIQ and LIQ.  High grade, comedonecrosis, and margins negative, 0/3 nodes, LEFT breast CCC, usual hyperplasia, and sclerosing adenosis. Margins clear closest 1.0 cm.   Stage PibT9A0- stage 0    In the interim,  Candie Arias  has done well.  She has noted no changes in her breast exam. No new masses, skin changes, skin discoloration on either reconstructed breast skin.  She denies headache, bone pain, belly pain, cough, changes in vision or gait.  Her most recent  imaging includes the following: None    She has lost 4 pounds.    She has stopped the tamoxifen as of June 16, 2015.        In the interim,  Candie Arias  has done well.  She has noted no changes in her reconstructed breast exam. No new masses, skin changes, either breast.   She denies headache, bone pain, belly pain, cough, changes in vision or gait.    She moved to [place] lymph there a year and moved back.  She did not see survivorship or nutrition.  Her weight is stable.  Her left knee replacement has had scarring and has demonstrated decreased mobility.        In the interim,  Candie Arias  has done well.  She has noted no changes in her reconstructed breast exam. No new masses, skin changes, either breast.   She denies headache, bone pain, belly pain, cough, changes in vision or gait.    She has gained 11 pounds in the interim.  In the interim she did go to see our nutritionist and she said that it wasn't useful but she just has a hard time following a diet.  She is having trouble with her left knee.        In the interim,  Candie Arias  has done well.  She has noted no changes in her reconstructed breast exam. No new masses, skin changes, either breast.   She denies headache, bone pain, belly pain, cough, changes in vision or gait.    She has gained 8 pounds in the interim.  She kindly tells me that she knows what  is supposed to do but has no self control and enjoys food.    Interval History:    In the interim,  Candie Arias  has done well.  She has noted no changes in her reconstructed breast exam. No new masses, skin changes, either breast.   She denies headache, bone pain, belly pain, cough, changes in vision or gait.    She has gained 9 pounds in the interim.  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  She elba ere for review.      Review of Systems:  Review of Systems   Constitutional: Positive for unexpected weight change (9  lb wt gain ).   All other systems reviewed and are negative.       Past Medical and Surgical History:  Breast Biopsy History:  Patient has had the following breast biopsies:Patient has had  2 number of breast biopsies in the past.  .   She had one on the  left  side in year 5/12, as per HPI  She had one on the  right  side in year  01/10. The pathology from this biopsy was  benign per her report.  The patient has never had a breast operation    Breast Cancer HIstory:  Patient does not have a past medical history of breast cancer.  Breast Operations, and year:  NONE  Obstetric/Gynecologic History:  Age menstrual periods began: 12  Patient is postmenopausal, entered menopause naturally at age: The date of her last menstrual period was  2007 at the time or Mirena IUD placement.  The patient started having perimenopausal symptoms in terms of hot flashes and mood lability since 2011 summer.   Number of pregnancies: 2  Number of live births: 1  Number of abortions or miscarriages: 1  Age of delivery of first child: 24  Patient did not breast feed.  Length of time taking birth control pills:30 YRS   Patient has never taken hormone replacement    Past Surgical History:   Procedure Laterality Date   • APPENDECTOMY  1972   • JOINT REPLACEMENT     • LAPAROSCOPIC CHOLECYSTECTOMY  2002   • MASTECTOMY     • TOTAL KNEE ARTHROPLASTY         Past Medical History:   Diagnosis Date   • Arthritis    • Back pain    • Benign essential hypertension    • Cancer (CMS/HCC)     breast   • Heart murmur    • HL (hearing loss)    • Hypertension    • Insomnia related to another mental disorder     ARTHRITIS   • Menopause    • Obesity        Prior Hospitalizations, other than for surgery or childbirth, and year:  NONE     Social History     Socioeconomic History   • Marital status:      Spouse name: Not on file   • Number of children: Not on file   • Years of education: Not on file   • Highest education level: Not on file   Tobacco Use   •  "Smoking status: Never Smoker   • Smokeless tobacco: Never Used   Substance and Sexual Activity   • Alcohol use: Yes     Comment: MODERATE    • Drug use: No   • Sexual activity: Defer     Patient is .  Patient is employed full time with the following occupation: CLERICAL  Patient drinks 1 servings of caffeine per day.    Family History:  Family History   Problem Relation Age of Onset   • Cancer Father    • Heart failure Mother    • Diabetes Other    • Breast cancer Other 38        SISTER'S DAUGHTER   • Testicular cancer Other         20S-SISTER'S SON        Vital Signs:  /81   Pulse (!) 125   Temp 97.7 °F (36.5 °C)   Ht 167.6 cm (66\")   Wt 108 kg (237 lb)   LMP  (LMP Unknown)   SpO2 97%   Breastfeeding No   BMI 38.25 kg/m²      Medications:    Current Outpatient Medications:   •  acetaminophen (Acetaminophen 8 Hour) 650 MG 8 hr tablet, 2 tablet twice daily, Disp: , Rfl:   •  celecoxib (CeleBREX) 200 MG capsule, Take 1 capsule by mouth 2 (Two) Times a Day As Needed for Moderate Pain . With food, Disp: 60 capsule, Rfl: 3  •  lisinopril-hydrochlorothiazide (PRINZIDE,ZESTORETIC) 20-12.5 MG per tablet, Take 1 tablet by mouth Daily., Disp: 90 tablet, Rfl: 1  •  Loratadine 10 MG capsule, Take  by mouth., Disp: , Rfl:   •  metFORMIN (Glucophage) 500 MG tablet, Take 1 tablet by mouth Daily With Breakfast., Disp: 30 tablet, Rfl: 3     Allergies:  Allergies   Allergen Reactions   • Rocephin [Ceftriaxone] Other (See Comments)     FEVER  INDUCED FEVER       Physical Examination:  /81   Pulse (!) 125   Temp 97.7 °F (36.5 °C)   Ht 167.6 cm (66\")   Wt 108 kg (237 lb)   LMP  (LMP Unknown)   SpO2 97%   Breastfeeding No   BMI 38.25 kg/m²   General Appearance:  Patient is in no distress.  She is well kept and has an obese build.   Psychiatric:  Patient with appropriate mood and affect. Alert and oriented to self, time, and place.    Breast, RIGHT: Surgically absent with implant in place.  There is a " well healed vertical incision starting at the mid intramammary crease.  No nodules or discolorations on the breast skin.    Breast, LEFT:  Surgically absent with implant in place.  There is a well healed vertical incision starting at the mid intramammary crease.  No nodules or discolorations on the breast skin.    Lymphatic:  There is no axillary, cervical, infraclavicular, or supraclavicular adenopathy bilaterally.  Eyes:  Pupils are round and reactive to light.  Cardiovascular:  Heart  rhythm is regular, rate is tachycardic at 125.  Respiratory:  Lungs are clear bilaterally with no crackles or wheezes in any lung field.  Gastrointestinal:  Abdomen is soft, nondistended, and nontender.  She has a vertical right lower quadrant incision from a remote appendectomy.  She has trocar incisions from her laparoscopic cholecystectomy.    Musculoskeletal:  Good strength in all 4 extremities.   There is good range of motion in both shoulders.    Skin:  No new skin lesions or rashes on the skin excluding the breast (see breast exam above).        Imagin12 Screening mammogram [facility] Hugo Schreiber  There are scattered fibroglandular densities.   Finding 1: There are several punctate and fine granular calcifications with grouped distribution seen in the middle one-third upper outer region of the left breast. There is an increased number of calcifications.   Finding 2: Stable isodense, oval mass measuring 11 millimeters lower inner region of the left breast.   Finding 3: Stable isodense, oval mass measuring 11 millimeteres upper outer region of the right breast. Note is made of a biopsy clip as evidence of a previous surgical procedure.   Finding 4: Punctate and spherical calcifications seen in both breasts.   Impression:   Finding 1: Calcifications in the left breast require additional evaluation.   Finding 2: Benign-Negative.   Finding 3: Benign-Negative.   Finding 4: Benign-Negative.   BI-RADS  Category 0    04-10-12 Left breast mammography [facility]  Impression:   Calcifications in the left breats are suspicious, stereotactic biopsy is recommended.   BIRADS Category 4B    05-17-12 Bilateral breast MRI [facility]  Within the posterior one-third upper outer quadrant left breast 2:30 o'clock position, there is a metallic clip denoting site of ADH/ ALH.   Within the upper-outer quadrant of the right breast at the 9:30 o'clock position 8.3 cm from the nipple. There is linear enhancement that extends over 1.3 cm in anterior posterior dimension. No mammographic correlate is appreciated.   Impression:   1.3 cm linear irregular enhancement 9:30 right breast 8 cm from the nipple. Correlation with an MR guided right breast biopsy is recommended.   Biopsy proven site of ADH and or ALH posterior one third upper outer quadrant of the left breast without any suspicious surounding enhancement.   BIRADS Category 4    03/22/13       Bilateral Diagnostic Mammogram       [facility]       Candie Hugo  There are scattered fibroglandular densities.   Finding 1: Follow-up calcifications in the left breast does not persist.   Finding 2: Follow-up isodense, oval mass measuring 22 millimeters with circumscribed margins in the lower inner region of the left breast has decreased in size.   Finding 3: Multiple stable punctate and spherical lucent-centered calcifications seen in both breasts.   IMPRESSION:  Finding 1: Benign-Negative  Finding 2: Benign-Negative   Finding 3: Benign-Negative   BI-RADS Category 2: benign     03/26/2014      [facility]      Bilateral Breast MRI      Hugo,Candie  Posterior one-third left breast lateral to the plane of the nipple,there is a 2.2 cm area of fat necrosis. A 0.7 cm oil cyst is also noted within the left breast.  There are no findings suspicious for malignancy in either breast.  Birads Category 2:Benign    03/26/14        [facility]         Bilateral Screening Mammogram with  Tomosynthesis       Candie Hugo  There are scattered fibroglandular densities.   Finding 1: Stable isodense, oval mass measuring 8 millimeters lower inner region of the left breast.   Finding 2: Multiple stable calcifications seen in both breasts.   Finding 3: Stable post surgical scar posterior one third upper outer region of the left breast.   IMPRESSION:  Finding 1: Benign Negative   Finding 2: Benign Negative  Finding 3: Benign Negative   BIRADS category 2: benign     03-27-15     [facility]    Bilateral Screen Mammogram w Tomosynthesis   Hugo, Candie    scattered fibroglandular densities  Finding 1:  There are new fine pleomorphic calcifications with Linear distribution seen in the  middle one third upper outer region of the right breast.  the new calcifications are flanked by the biopsy  clips in the right upper outer quadrant.    Finding 2:  isodense, oval mass measuring 8 millimeters with circumscribed margins seen in the lower  inner region of the left breast.    This finding is most consistent with a cyst.    Finding 3:  stable post-surgical scar seen in the upper outer region of the left breast.  in a area of prior excisional biopsy.    Impression:    Finding 1:  Breast require additional evaluation, spot compression magnification views are recommended.  Finding 2:  Benign Negative  Finding 3:  Benign Negative    Birads Category 0    03-27-15     [facility]     Bilateral Breast MRI               Hugo, Candie  posterior one third lateral left breast 3 o'clock 10 cm from the nipple,  post surgical architectural distortion with a 1.8 cm oil cyst.  Within the right breast anterior one third centered 11:30 region of linear, borderline clumped, branching enhancement  that measures on the order of 6.4 cm in anterior to posterior dimension, 2.8 cm in the superior to inferior dimension and 1.4 cm in the medial to lateral dimension.  A mammographic  examination is not available for comparison.  I see no evidence for  "abnormal right breast skin, nipple, or chest wall enhancement and there  is no evidence for right axillary adenopathy.  Impression:  The imaging features are suspicious for the presence of atypia and/or DCIS.   Ultimately,  percutaneous biopsy of the region is recommended and can be performed  under MRI guidance,  however, if thre are calcifications seen in the region a stereotactic guided biopsy  can be performed.  There are no findings suspicious for malignancy in the left breast.  Birads Category 4 Suspicious      04/01/15           [facility]            RIGHT DIGITAL DIAG MAMMOGRAM WITH TOMOSYNTHESIS              Candie Hugo  Additional evaluation calcifications in the right breast, upper outer. there are multiple fine pleomorphic calcification with linear distraction in the middle one-third upper outer region of the right breast. There is an increased number of calcifications. Tissue sampling is recommended.   IMPRESSION:   BI-RADS Category 4C    Pathology:  05-03-12 Stereotactic biopsy [facility]  12 core needle biopsy specimens. 9 gauge vacuum assisted device. Marker clip was deployed.     05-03-12 Pathology results [facility]  Diagnosis:   Left breast upper outer quadrant.  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   Atypical lobular hyperplasia (ALH) is also noted.   Surounding breast parenchyma demonstrates pseudoangiomatous stromal hyperplasia, columnar cell hyperplasia, apocrine change, and fibrocystic changes.       6-6-12- OPER:  RIGHT BREAST MRI-GUIDED BIOPSY   Final Diagnosis  \"RIGHT BREAST LINEAR ENHANCEMENT AT 9:30\", CORE BIOPSIES:       FOCAL COLUMNAR CELL CHANGE WITHOUT ATYPIA.       MILD FIBROCYSTIC CHANGES WITH SCLEROSING ADENOSIS, DUCT ECTASIA.      7-5-12-  LEFT lumpectomy   Final Diagnosis  LEFT BREAST LUMPECTOMY:       BREAST TISSUE WITH FOCAL " ATYPICAL DUCT HYPERPLASIA AT SITE OF PRIOR BIOPSY.       FOCI OF ATYPICAL LOBULAR HYPERPLASIA.       ADDITIONAL FIBROCYSTIC CHANGES INCLUDING APOCRINE METAPLASIA, COLUMNAR             CELL CHANGE, ADENOSIS AND MICROCYST FORMATION.    COMMENT: Margins free of atypical duct hyperplasia.  Atypical duct hyperplasia  is present on slide 1H.      04/02/15                 [facility]       Stereotactic Biopsy                Candie Arias  right breast  12 core needle biopsy specimens were obtained using a 9 gauge vacuum assisted device. Top hat shaped s-jorge l eviva tissue marker was deployed within the biopsy bed.   ADDENDUM  The pathology report from [facility] was received on 4/3/2015, and the initial report demonstrated benign pathology. This was discordant. Jessica Potter did additional cuts and the study demonstrated ductal carcinoma in situ (DCIS), high grade with comedo necrosis. This is concordant.   Please note that the calcifications extend from the AP dimension approximately 7 cm. This roughly correlates to the recent findings on the MRI. These Calcifications are also located between two previously biopsy clip markers. Stereotactic biopsy that I performed is in the center of these two and is a top hat shaped marker.     04/02/15             [facility]                   Pathology Report                  Candie Arias A   DIAGNOSIS  Right breast upper outer quadrant, stereotactic bore biopsies:  ductal carcinoma in situ (DCIS), high grade with comedo Necrosis , at least 0.3 cm in dimension with determinate microcalcifications.   ORIGINAL DIAGNOSIS:  Right breast upper outer quadrant, stereotactic core biopsies:   Benign breast parenchyma with areas of mild hyalinize stromal change and occasional microcalcifications.   Negative for atypia or malignancy.       Received: 5/20/2015  Reported: 5/21/2015    Clinical Diagnosis and History       Right breast cancer/DCIS       Operation: Bilateral total breast mastectomy with  right axilla sentinel  node biopsy - frozen section       Diagnosis  1: SENTINEL LYMPH NODE #1, RIGHT AXILLA, EXCISION:       ONE LYMPH NODE, NEGATIVE FOR METASTATIC CARCINOMA.     2: SENTINEL LYMPH NODE #2, RIGHT AXILLA, EXCISION:       ONE LYMPH NODE, NEGATIVE FOR METASTATIC CARCINOMA.     3: SENTINEL LYMPH NODE #3, RIGHT AXILLA, EXCISION:       ONE LYMPH NODE, NEGATIVE FOR METASTATIC CARCINOMA.     4: BREAST, RIGHT, MASTECTOMY:       MULTIFOCAL DUCTAL CARCINOMA IN SITU (DCIS).        FOCAL PAGET'S DISEASE OF NIPPLE (CONFIRMED WITH IMMUNOHISTOCHEMICAL      STAIN FOR CYTOKERATIN 7).        NO DEFINITE INVASION IS SEEN IN SECTIONS EXAMINED.        SEE SYNOPTIC REPORT (BELOW) FOR ADDITIONAL DETAILS.     5: BREAST, LEFT, MASTECTOMY:       EXTENSIVE FIBROSIS IN UPPER OUTER QUADRANT, SUGGESTIVE OF SITE OF PRIOR       LUMPECTOMY ( JULY 2012).       FIBROCYSTIC CHANGES WITH DUCT DILATATION AND STASIS, MICROCYST FORMATION,      AND APOCRINE METAPLASIA.        COLUMNAR CELL CHANGE WITHOUT ATYPIA.        FOCAL DUCTAL HYPERPLASIA OF THE USUAL TYPE.        FOCAL DUCT/CYST RUPTURE WITH ASSOCIATED FOREIGN BODY-TYPE GIANT CELL      REACTION.        FOCAL SCLEROSING ADENOSIS.       UNREMARKABLE NIPPLE.        NO EVIDENCE OF MALIGNANCY.       SYNOPTIC REPORT (BASED ON CAP CANCER CASE SUMMARY, version December 2013):       Procedure: Total mastectomy (including nipple and skin).       Lymph node sampling: [place] lymph nodes.        Specimen laterality: Right.        Tumor site: Multiple foci of DCIS in upper outer quadrant, mid to upper  inner quadrant, and focally            in lower inner quadrant.        Size (extent) of DCIS: Approximately 3.5 cm in mid superior breast and  involving upper inner and      upper outer quadrants (estimate based on gross evaluation).        Histologic type: Ductal carcinoma in situ.             Architectural patterns: Solid and comedo.             Nuclear grade: Grade III (high).              Necrosis: Present, central comedonecrosis.       Macroscopic and microscopic extent of tumor:            Skin: Not applicable.            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            Skeletal muscle: Not applicable.        Margins: Uninvolved by DCIS.             Distance from closest margin: Greater than 10 mm in sections examined  (superficial and           deep margins).        Treatment effect (response to presurgical neoadjuvant therapy): No known  presurgical       therapy.        Lymph nodes:            Total number of lymph nodes examined (sentinel and nonsentinel): 3.            Number of sentinel lymph nodes examined: 3.            Number of lymph nodes with macrometastases: 0.            Number of lymph nodes with micrometastases: 0.            Number of lymph nodes with isolated tumor cells: 0.            Method of evaluation of sentinel lymph nodes: H&E, multiple levels  (confirmatory           immunohistochemical stains are available upon request).        Pathologic staging:             Primary tumor: pTis (DCIS).             Regional lymph nodes: pN0(sn).            Distant metastasis: Not applicable.        Additional pathologic findings in Right Breast:        Fibrocystic changes with duct dilatation and stasis and apocrine  metaplasia.             Focal sclerosing adenosis.            Columnar cell change without atypia.             Ductal hyperplasia of the usual type.             At least two separate biopsy sites identified.         Ancillary studies: ER, [place], and Ki-67 studies performed on previous biopsy  specimen at outside      facility ([facility]).        Microcalcifications: Present in both DCIS and nonneoplastic tissue.       TDJ/hmf IHC/a/CMK      Procedures:      Assessment:   Diagnosis Plan   1. Ductal carcinoma in situ (DCIS) of right breast     2. Atypical lobular hyperplasia (ALH) of breast     3. Atypical ductal hyperplasia of breast     4. FH:  breast cancer     5. Tachycardia       1-  RIGHT breast 6.4 cm span enhancement on MRI AP- 11:30 anterior third; associated 7 -8 cm calcifications on mammogram  Clinical stage TisN0- stage 0    5-20-15 bilateral mastectomy and RIGHT SLNB returned as 3.5+ cm of multifocal DCIS with pagets of the nipple. DCIS present in UOQ, UIQ and LIQ.  High grade, comedonecrosis, and margins negative, 0/3 nodes, LEFT breast CCC, usual hyperplasia, and sclerosing adenosis. Margins clear closest 1.0 cm.   Pathologic Stage [id number]- stage 0  - recosntruction with expanders, immediate, Dr Waterhouse  - did not need XRT based on clear margins    2-3  -LEFT breast ADH-borderline DCIS and LEFT breast ALH on core biopsy  - both located at site of mammographic calcificaitons, 1.6 cm AP on mammo, middle 1/3 OUQ  - 7-2012 excision atypical hyerplasia- residual ADH and ALH noted  - path review at [facility] on cores suggest no DCIS present on initial cores  - Her sister has a protein S deficiency and hypercoagulable state with prior pulmonary emboli-  CBC found to have mutation in the MTHFR gene (clotting) , but that this is insignificant for perioperative considerations . Not on antothombotic agents  -  tamoxifen 7-18-12 -Dr. Sexton - stopped 6-16-15 after mastectomies  - seen by Dr. Mederos- did not need radiation  -11-4-15 implant placement Dr Waterhouse    4  maternal niece age 38- she was tested and BRCA negative  Patient: 04/24/15                  Gene Dx             Candie Arias  Comprehensive Cancer Panel   Results Summary:  NEGATIVE     5-  125-130 ,     Plan:  Candie Arias is doing well today at her 5.5 year visit. We reviewed her interval history, examination and symptoms. There is no evidence of disease today.    I cautioned her against the continued weight gain.  We discussed the adverse prognostic effect of weight gain on breast cancer recurrence risk.  I offered for her to see our nutritionist and she politely  declined.  Today her heart rate was 125 in the office we rechecked it and it was 129.  We are going to contact her primary care physician for instructions to give to Hugo Houser today.    Shelia tells me that it has been high since her knee surgery.  It is regular rhythm.    We again discussed the option of 3D nipple tattooing-he has decided that she will not pursue nipple tattooing now or in the future.  We reviewed her surveillance together today.  We discussed that there is no role for imaging unless there is a focused exam finding or symptom.  Her main surveillance will be based on physical examination and/or symptoms.    At 5-1/2 years post treatment for DCIS I have not given her a routine follow-up in our office.    I asked her to continue her self breast exam monthly, annual clinical breast exam with her primary care Dr. Meme Ignacio, and to call us in the future with concerns and we be happy to see her back.       Ana Duque MD    15 minute visit,k 10 face to face     Next Appointment:  Return for any future concerns.

## 2020-11-09 NOTE — TELEPHONE ENCOUNTER
DR MILLARD'S OFFICE CALLED- PATIENT WAS SEEN TODAY AND HAD HIGH PULSE -125 UPON ARRIVAL, 129 AND  130 PRIOR TO LEAVING.PATIENT STATED SHE HAS ALWAYS BEEN ABOVE 100 SINCE KNEE SURGERY     DR MILLARD WANTED TO ADVISE    MARLON MILLARD-[phone]

## 2020-11-09 NOTE — TELEPHONE ENCOUNTER
Message with Yamel; Ester Ignacio's office. Patient , 129 and 130 in office today. Patient tells us her heart rate stays above 100 since her knee surgery. Could Ester advise patient on what to do.

## 2020-11-11 ENCOUNTER — CLINICAL SUPPORT (OUTPATIENT)
Dept: NEUROLOGY | Facility: CLINIC | Age: 60
End: 2020-11-11

## 2020-11-11 ENCOUNTER — OFFICE VISIT (OUTPATIENT)
Dept: FAMILY MEDICINE CLINIC | Facility: CLINIC | Age: 60
End: 2020-11-11

## 2020-11-11 VITALS
SYSTOLIC BLOOD PRESSURE: 139 MMHG | BODY MASS INDEX: 37.54 KG/M2 | OXYGEN SATURATION: 97 % | TEMPERATURE: 97.3 F | HEART RATE: 99 BPM | DIASTOLIC BLOOD PRESSURE: 75 MMHG | HEIGHT: 66 IN | WEIGHT: 233.6 LBS

## 2020-11-11 DIAGNOSIS — R53.83 OTHER FATIGUE: ICD-10-CM

## 2020-11-11 DIAGNOSIS — R40.0 DAYTIME SLEEPINESS: ICD-10-CM

## 2020-11-11 DIAGNOSIS — M25.50 POLYARTHRALGIA: ICD-10-CM

## 2020-11-11 DIAGNOSIS — M12.9 ARTHROPATHY, UNSPECIFIED: ICD-10-CM

## 2020-11-11 DIAGNOSIS — R06.83 SNORING: ICD-10-CM

## 2020-11-11 DIAGNOSIS — M14.80: Primary | ICD-10-CM

## 2020-11-11 DIAGNOSIS — R73.03 PREDIABETES: ICD-10-CM

## 2020-11-11 DIAGNOSIS — E22.0: Primary | ICD-10-CM

## 2020-11-11 DIAGNOSIS — E53.8 B12 DEFICIENCY: Primary | ICD-10-CM

## 2020-11-11 DIAGNOSIS — R00.0 TACHYCARDIA: ICD-10-CM

## 2020-11-11 PROCEDURE — 99214 OFFICE O/P EST MOD 30 MIN: CPT | Performed by: NURSE PRACTITIONER

## 2020-11-11 PROCEDURE — 96372 THER/PROPH/DIAG INJ SC/IM: CPT | Performed by: PSYCHIATRY & NEUROLOGY

## 2020-11-11 RX ORDER — CYANOCOBALAMIN 1000 UG/ML
1000 INJECTION, SOLUTION INTRAMUSCULAR; SUBCUTANEOUS ONCE
Status: COMPLETED | OUTPATIENT
Start: 2020-11-11 | End: 2020-11-11

## 2020-11-11 RX ADMIN — CYANOCOBALAMIN 1000 MCG: 1000 INJECTION, SOLUTION INTRAMUSCULAR; SUBCUTANEOUS at 10:34

## 2020-11-11 NOTE — PROGRESS NOTES
Subjective   Candie Arias is a 60 y.o. female.     Chief Complaint   Patient presents with   • Rapid Heart Rate      HPI patient was referred back for tachycardia noted by breast surgery.  She was found to have a heart rate around 125-130 at visit.  Patient normally has a high heart rate right around 100.  She tells me she has been worked up previously by cardiology and this was all found to be benign.    She feels about the same with her chronic pain and fatigue.  She is on week 3 with neuro on try to increase her B 12 levels due to low B12.  She does admit that she seems to have a little bit more balance problem but is not sure if this is any different since her knee surgery.  Her toes now currently are more numb than tingling in her right hand is more tingly than it was prior to B12.    She is concerned that she may have acromegaly.  This was found in her mother and it was only noticed when her mother was older due to denture problems as her job was growing.  Patient has no denture problems and is worried it may be missed.  She reports in the last couple years her foot size has increased from 7-1/2-9 and she also notices that her hands are growing.  Last year she started snoring and noticed some daytime sleepiness.    She has been trying to follow as much as possible diabetic diet.  She does like sweets and comfort foods and she is for the most part inactive due to left knee pain which is chronic and her chronic pain and fatigue.    Social History     Tobacco Use   • Smoking status: Never Smoker   • Smokeless tobacco: Never Used   Substance Use Topics   • Alcohol use: Yes     Comment: MODERATE    • Drug use: No       The following portions of the patient's history were reviewed and updated as appropriate: allergies, current medications, past family history, past medical history, past social history, past surgical history and problem list.    Review of Systems   Constitutional: Negative for chills and fever.  "  Respiratory: Negative for cough, chest tightness, shortness of breath and wheezing.    Cardiovascular: Negative for chest pain, palpitations and leg swelling.   Gastrointestinal: Negative for abdominal pain, diarrhea, nausea and vomiting.   Endocrine: Negative for polydipsia and polyuria.   Musculoskeletal: Positive for arthralgias, gait problem, joint swelling and myalgias.   Skin: Negative for wound.   Neurological: Negative for dizziness, weakness and headaches.       Objective   Blood pressure 139/75, pulse 99, temperature 97.3 °F (36.3 °C), temperature source Temporal, height 167.6 cm (66\"), weight 106 kg (233 lb 9.6 oz), SpO2 97 %, not currently breastfeeding.  Body mass index is 37.7 kg/m².    Physical Exam  Vitals signs and nursing note reviewed.   Constitutional:       General: She is not in acute distress.     Appearance: She is well-developed. She is not ill-appearing or diaphoretic.   HENT:      Head: Normocephalic and atraumatic.   Eyes:      General:         Right eye: No discharge.         Left eye: No discharge.      Conjunctiva/sclera: Conjunctivae normal.   Neck:      Musculoskeletal: Neck supple.      Thyroid: No thyromegaly.   Cardiovascular:      Rate and Rhythm: Regular rhythm. Tachycardia present.      Pulses: Normal pulses.      Heart sounds: Normal heart sounds.   Pulmonary:      Effort: Pulmonary effort is normal.      Breath sounds: Normal breath sounds.   Abdominal:      General: Bowel sounds are normal.      Palpations: Abdomen is soft.      Tenderness: There is no abdominal tenderness.   Musculoskeletal:         General: No deformity.      Comments: Gait smooth and steady   Skin:     General: Skin is warm and dry.   Neurological:      General: No focal deficit present.      Mental Status: She is alert and oriented to person, place, and time.   Psychiatric:         Mood and Affect: Mood normal.         Behavior: Behavior normal.         Assessment   Problem List Items Addressed This " Visit        Endocrine    Prediabetes    Relevant Orders    Insulin-like Growth Factor       Nervous and Auditory    Polyarthralgia    Relevant Orders    Insulin-like Growth Factor      Other Visit Diagnoses     Arthropathy associated with acromegaly (CMS/HCC)    -  Primary    Arthropathy, unspecified        Relevant Orders    Insulin-like Growth Factor    Other fatigue        Relevant Orders    Ferritin             ECG 12 Lead    Date/Time: 11/12/2020 4:28 PM  Performed by: Ester Ignacio APRN  Authorized by: Ester Ignacio APRN   Comparison: compared with previous ECG from 8/20/2018  Similar to previous ECG  Rhythm: sinus rhythm  Rate: normal  Conduction: conduction normal  ST Segments: ST segments normal  T Waves: T waves normal  QRS axis: normal  Other: no other findings    Clinical impression: normal ECG                   Impression and Plan: Tachycardia: Her EKG showed a normal rate although it was at the top end of normal.  Reviewing her chart she does usually have mild tachycardia.  She has had past EKG showing bigeminy but most recently her cardiac work-up was okay and her last EKG in 2018 was similar to today and showed normal sinus rhythm.  To visit with Dr. Cooney in 2015 for tachycardia and palpitations but at that time had a normal Holter monitor and a normal echo.  He thought she was a low risk at that time but did recommend periodic monitoring and to return for a stress echo.  Patient had a normal stress echo.  Patient is not having any symptoms.    Gets always a good idea is see cardiology yearly with her history.  I do not know that she needs to do that right away especially with risk of Covid and unless she has additional symptoms could delay that until spring.    We will get labs for possible acromegaly.  She does have constellation of symptoms including prediabetes and not overt diabetes.  I do recommend she get a sleep study for sleep apnea.  He also has significant arthropathy.  We  discussed that the labs may not be covered by insurance and she is fine with this.    We will also check ferritin for chronic fatigue as this was not checked last time.    She has not had much improvement in her fatigue and neuropathy since starting B12 but we are still early in that process.    We will need regular surveillance for history of breast cancer.  She will no longer be followed by breast surgery and has been in remission for 5 years.      Health Maintenance Due   Topic Date Due   • COLONOSCOPY  1960   • TDAP/TD VACCINES (1 - Tdap) 01/24/1978   • PAP SMEAR  10/06/2016   • ANNUAL PHYSICAL  06/25/2020   • INFLUENZA VACCINE  08/01/2020              EMR Dragon/Transcription disclaimer:   Much of this encounter note is an electronic transcription/translation of spoken language to printed text. The electronic translation of spoken language may permit erroneous, or at times, nonsensical words or phrases to be inadvertently transcribed; Although I have reviewed the note for such errors, some may still exist.

## 2020-11-12 PROCEDURE — 93000 ELECTROCARDIOGRAM COMPLETE: CPT | Performed by: NURSE PRACTITIONER

## 2020-11-13 LAB
FERRITIN SERPL-MCNC: 222 NG/ML (ref 13–150)
IGF-I SERPL-MCNC: 80 NG/ML (ref 57–202)

## 2020-11-18 DIAGNOSIS — R73.03 PREDIABETES: ICD-10-CM

## 2020-11-23 ENCOUNTER — TELEPHONE (OUTPATIENT)
Dept: FAMILY MEDICINE CLINIC | Facility: CLINIC | Age: 60
End: 2020-11-23

## 2020-11-25 ENCOUNTER — CLINICAL SUPPORT (OUTPATIENT)
Dept: NEUROLOGY | Facility: CLINIC | Age: 60
End: 2020-11-25

## 2020-11-25 DIAGNOSIS — E53.8 B12 DEFICIENCY: Primary | ICD-10-CM

## 2020-11-25 PROCEDURE — 96372 THER/PROPH/DIAG INJ SC/IM: CPT | Performed by: PSYCHIATRY & NEUROLOGY

## 2020-11-25 RX ORDER — CYANOCOBALAMIN 1000 UG/ML
1000 INJECTION, SOLUTION INTRAMUSCULAR; SUBCUTANEOUS ONCE
Status: COMPLETED | OUTPATIENT
Start: 2020-11-25 | End: 2020-11-25

## 2020-11-25 RX ADMIN — CYANOCOBALAMIN 1000 MCG: 1000 INJECTION, SOLUTION INTRAMUSCULAR; SUBCUTANEOUS at 10:08

## 2020-12-09 ENCOUNTER — CLINICAL SUPPORT (OUTPATIENT)
Dept: NEUROLOGY | Facility: CLINIC | Age: 60
End: 2020-12-09

## 2020-12-09 DIAGNOSIS — E53.8 B12 DEFICIENCY: Primary | ICD-10-CM

## 2020-12-09 PROCEDURE — 96372 THER/PROPH/DIAG INJ SC/IM: CPT | Performed by: PSYCHIATRY & NEUROLOGY

## 2020-12-09 RX ORDER — CYANOCOBALAMIN 1000 UG/ML
1000 INJECTION, SOLUTION INTRAMUSCULAR; SUBCUTANEOUS ONCE
Status: COMPLETED | OUTPATIENT
Start: 2020-12-09 | End: 2020-12-09

## 2020-12-09 RX ADMIN — CYANOCOBALAMIN 1000 MCG: 1000 INJECTION, SOLUTION INTRAMUSCULAR; SUBCUTANEOUS at 10:23

## 2020-12-23 ENCOUNTER — CLINICAL SUPPORT (OUTPATIENT)
Dept: NEUROLOGY | Facility: CLINIC | Age: 60
End: 2020-12-23

## 2020-12-23 DIAGNOSIS — E53.8 B12 DEFICIENCY: Primary | ICD-10-CM

## 2020-12-23 PROCEDURE — 96372 THER/PROPH/DIAG INJ SC/IM: CPT | Performed by: PSYCHIATRY & NEUROLOGY

## 2020-12-23 RX ORDER — CYANOCOBALAMIN 1000 UG/ML
1000 INJECTION, SOLUTION INTRAMUSCULAR; SUBCUTANEOUS
Status: DISCONTINUED | OUTPATIENT
Start: 2020-12-23 | End: 2021-06-01

## 2020-12-23 RX ADMIN — CYANOCOBALAMIN 1000 MCG: 1000 INJECTION, SOLUTION INTRAMUSCULAR; SUBCUTANEOUS at 10:27

## 2021-01-06 ENCOUNTER — CLINICAL SUPPORT (OUTPATIENT)
Dept: NEUROLOGY | Facility: CLINIC | Age: 61
End: 2021-01-06

## 2021-01-06 DIAGNOSIS — E53.8 B12 DEFICIENCY: Primary | ICD-10-CM

## 2021-01-06 PROCEDURE — 96372 THER/PROPH/DIAG INJ SC/IM: CPT | Performed by: PSYCHIATRY & NEUROLOGY

## 2021-01-06 RX ORDER — CYANOCOBALAMIN 1000 UG/ML
1000 INJECTION, SOLUTION INTRAMUSCULAR; SUBCUTANEOUS ONCE
Status: COMPLETED | OUTPATIENT
Start: 2021-01-06 | End: 2021-01-06

## 2021-01-06 RX ADMIN — CYANOCOBALAMIN 1000 MCG: 1000 INJECTION, SOLUTION INTRAMUSCULAR; SUBCUTANEOUS at 10:27

## 2021-01-20 ENCOUNTER — CLINICAL SUPPORT (OUTPATIENT)
Dept: NEUROLOGY | Facility: CLINIC | Age: 61
End: 2021-01-20

## 2021-01-20 DIAGNOSIS — E53.8 B12 DEFICIENCY: Primary | ICD-10-CM

## 2021-01-20 PROCEDURE — 96372 THER/PROPH/DIAG INJ SC/IM: CPT | Performed by: PSYCHIATRY & NEUROLOGY

## 2021-01-20 RX ORDER — CYANOCOBALAMIN 1000 UG/ML
1000 INJECTION, SOLUTION INTRAMUSCULAR; SUBCUTANEOUS ONCE
Status: COMPLETED | OUTPATIENT
Start: 2021-01-20 | End: 2021-01-20

## 2021-01-20 RX ADMIN — CYANOCOBALAMIN 1000 MCG: 1000 INJECTION, SOLUTION INTRAMUSCULAR; SUBCUTANEOUS at 10:40

## 2021-02-03 ENCOUNTER — CLINICAL SUPPORT (OUTPATIENT)
Dept: NEUROLOGY | Facility: CLINIC | Age: 61
End: 2021-02-03

## 2021-02-03 DIAGNOSIS — E53.8 B12 DEFICIENCY: Primary | ICD-10-CM

## 2021-02-03 PROCEDURE — 96372 THER/PROPH/DIAG INJ SC/IM: CPT | Performed by: PSYCHIATRY & NEUROLOGY

## 2021-02-03 RX ORDER — CYANOCOBALAMIN 1000 UG/ML
1000 INJECTION, SOLUTION INTRAMUSCULAR; SUBCUTANEOUS ONCE
Status: COMPLETED | OUTPATIENT
Start: 2021-02-03 | End: 2021-02-03

## 2021-02-03 RX ADMIN — CYANOCOBALAMIN 1000 MCG: 1000 INJECTION, SOLUTION INTRAMUSCULAR; SUBCUTANEOUS at 10:05

## 2021-02-17 ENCOUNTER — CLINICAL SUPPORT (OUTPATIENT)
Dept: NEUROLOGY | Facility: CLINIC | Age: 61
End: 2021-02-17

## 2021-02-17 DIAGNOSIS — M25.50 POLYARTHRALGIA: ICD-10-CM

## 2021-02-17 DIAGNOSIS — E53.8 B12 DEFICIENCY: Primary | ICD-10-CM

## 2021-02-17 DIAGNOSIS — R73.03 PREDIABETES: ICD-10-CM

## 2021-02-17 PROCEDURE — 96372 THER/PROPH/DIAG INJ SC/IM: CPT | Performed by: PSYCHIATRY & NEUROLOGY

## 2021-02-17 RX ORDER — CYANOCOBALAMIN 1000 UG/ML
1000 INJECTION, SOLUTION INTRAMUSCULAR; SUBCUTANEOUS ONCE
Status: COMPLETED | OUTPATIENT
Start: 2021-02-17 | End: 2021-02-17

## 2021-02-17 RX ORDER — CELECOXIB 200 MG/1
CAPSULE ORAL
Qty: 60 CAPSULE | Refills: 2 | Status: SHIPPED | OUTPATIENT
Start: 2021-02-17 | End: 2021-05-24

## 2021-02-17 RX ADMIN — CYANOCOBALAMIN 1000 MCG: 1000 INJECTION, SOLUTION INTRAMUSCULAR; SUBCUTANEOUS at 10:03

## 2021-02-18 ENCOUNTER — TELEPHONE (OUTPATIENT)
Dept: FAMILY MEDICINE CLINIC | Facility: CLINIC | Age: 61
End: 2021-02-18

## 2021-02-18 DIAGNOSIS — R73.03 PREDIABETES: ICD-10-CM

## 2021-02-18 DIAGNOSIS — R73.03 PREDIABETES: Primary | ICD-10-CM

## 2021-03-18 ENCOUNTER — LAB (OUTPATIENT)
Dept: LAB | Facility: HOSPITAL | Age: 61
End: 2021-03-18

## 2021-03-18 DIAGNOSIS — E53.8 B12 DEFICIENCY: ICD-10-CM

## 2021-03-18 LAB
ALBUMIN SERPL-MCNC: 4.3 G/DL (ref 3.5–5.2)
ALBUMIN/GLOB SERPL: 1.6 G/DL
ALP SERPL-CCNC: 73 U/L (ref 39–117)
ALT SERPL W P-5'-P-CCNC: 13 U/L (ref 1–33)
ANION GAP SERPL CALCULATED.3IONS-SCNC: 9.8 MMOL/L (ref 5–15)
AST SERPL-CCNC: 15 U/L (ref 1–32)
BILIRUB SERPL-MCNC: 0.3 MG/DL (ref 0–1.2)
BUN SERPL-MCNC: 20 MG/DL (ref 8–23)
BUN/CREAT SERPL: 27 (ref 7–25)
CALCIUM SPEC-SCNC: 9.8 MG/DL (ref 8.6–10.5)
CHLORIDE SERPL-SCNC: 103 MMOL/L (ref 98–107)
CO2 SERPL-SCNC: 26.2 MMOL/L (ref 22–29)
CREAT SERPL-MCNC: 0.74 MG/DL (ref 0.57–1)
GFR SERPL CREATININE-BSD FRML MDRD: 80 ML/MIN/1.73
GLOBULIN UR ELPH-MCNC: 2.7 GM/DL
GLUCOSE SERPL-MCNC: 111 MG/DL (ref 65–99)
HBA1C MFR BLD: 6.4 % (ref 4.8–5.6)
POTASSIUM SERPL-SCNC: 4.3 MMOL/L (ref 3.5–5.2)
PROT SERPL-MCNC: 7 G/DL (ref 6–8.5)
SODIUM SERPL-SCNC: 139 MMOL/L (ref 136–145)
VIT B12 BLD-MCNC: 532 PG/ML (ref 211–946)

## 2021-03-18 PROCEDURE — 36415 COLL VENOUS BLD VENIPUNCTURE: CPT

## 2021-03-18 PROCEDURE — 80053 COMPREHEN METABOLIC PANEL: CPT | Performed by: NURSE PRACTITIONER

## 2021-03-18 PROCEDURE — 82607 VITAMIN B-12: CPT

## 2021-03-18 PROCEDURE — 83036 HEMOGLOBIN GLYCOSYLATED A1C: CPT | Performed by: NURSE PRACTITIONER

## 2021-03-18 RX ORDER — LISINOPRIL AND HYDROCHLOROTHIAZIDE 20; 12.5 MG/1; MG/1
TABLET ORAL
Qty: 90 TABLET | Refills: 0 | Status: SHIPPED | OUTPATIENT
Start: 2021-03-18 | End: 2021-06-14 | Stop reason: SDUPTHER

## 2021-03-22 ENCOUNTER — BULK ORDERING (OUTPATIENT)
Dept: CASE MANAGEMENT | Facility: OTHER | Age: 61
End: 2021-03-22

## 2021-03-22 DIAGNOSIS — Z23 IMMUNIZATION DUE: ICD-10-CM

## 2021-03-24 ENCOUNTER — IMMUNIZATION (OUTPATIENT)
Dept: VACCINE CLINIC | Facility: HOSPITAL | Age: 61
End: 2021-03-24

## 2021-03-24 DIAGNOSIS — Z23 IMMUNIZATION DUE: ICD-10-CM

## 2021-03-24 PROCEDURE — 0001A: CPT | Performed by: INTERNAL MEDICINE

## 2021-03-24 PROCEDURE — 91300 HC SARSCOV02 VAC 30MCG/0.3ML IM: CPT | Performed by: INTERNAL MEDICINE

## 2021-04-14 ENCOUNTER — IMMUNIZATION (OUTPATIENT)
Dept: VACCINE CLINIC | Facility: HOSPITAL | Age: 61
End: 2021-04-14

## 2021-04-14 PROCEDURE — 91300 HC SARSCOV02 VAC 30MCG/0.3ML IM: CPT | Performed by: INTERNAL MEDICINE

## 2021-04-14 PROCEDURE — 0002A: CPT | Performed by: INTERNAL MEDICINE

## 2021-05-18 DIAGNOSIS — R73.03 PREDIABETES: ICD-10-CM

## 2021-05-24 ENCOUNTER — OFFICE VISIT (OUTPATIENT)
Dept: NEUROLOGY | Facility: CLINIC | Age: 61
End: 2021-05-24

## 2021-05-24 VITALS
SYSTOLIC BLOOD PRESSURE: 128 MMHG | OXYGEN SATURATION: 98 % | HEART RATE: 64 BPM | HEIGHT: 66 IN | WEIGHT: 234 LBS | DIASTOLIC BLOOD PRESSURE: 84 MMHG | BODY MASS INDEX: 37.61 KG/M2

## 2021-05-24 DIAGNOSIS — M79.10 MYALGIA: ICD-10-CM

## 2021-05-24 DIAGNOSIS — R20.2 PARESTHESIAS: ICD-10-CM

## 2021-05-24 DIAGNOSIS — E53.8 B12 DEFICIENCY: Primary | ICD-10-CM

## 2021-05-24 PROCEDURE — 99213 OFFICE O/P EST LOW 20 MIN: CPT | Performed by: PSYCHIATRY & NEUROLOGY

## 2021-05-24 RX ORDER — CETIRIZINE HYDROCHLORIDE 10 MG/1
TABLET ORAL
COMMUNITY
Start: 2021-03-01 | End: 2023-02-01 | Stop reason: ALTCHOICE

## 2021-05-24 RX ORDER — NAPROXEN 500 MG/1
TABLET ORAL
COMMUNITY
Start: 2021-05-10 | End: 2022-04-06 | Stop reason: ALTCHOICE

## 2021-05-24 NOTE — PROGRESS NOTES
Chief Complaint   Patient presents with   • B12 deficiency       Patient ID: Candie Arias is a 61 y.o. female.    HPI: I have had the pleasure of seeing your patient today.  As you may know she is a 61-year-old female here for follow-up for vitamin B12 deficiency, paresthesias, fatigue and memory issues.  She says that her memory issues are not as pronounced as they were at her initial visit with us.  She still does have some fatigue throughout the day which has been a chronic issue along with the pain.  The paresthesias are noted in her fingers bilaterally.  Mostly the fourth and fifth digits.  She also has tingling sensations in her feet, predominantly the toes and into the soles of her feet.  She does have a history of diabetes for about a year or so.  She says that she has had a lot of more muscular pain in her hands, arms, shoulders, hips, thighs, calves and feet.  The symptoms have been predominant for several years now    The following portions of the patient's history were reviewed and updated as appropriate: allergies, current medications, past family history, past medical history, past social history, past surgical history and problem list.    Review of Systems   Musculoskeletal: Negative for back pain, gait problem and neck pain.   Skin: Negative for color change, rash and wound.   Allergic/Immunologic: Negative for environmental allergies, food allergies and immunocompromised state.   Neurological: Negative for dizziness, tremors, seizures, syncope, facial asymmetry, speech difficulty, weakness, light-headedness, numbness and headaches.   Hematological: Negative for adenopathy. Does not bruise/bleed easily.   Psychiatric/Behavioral: Negative for agitation, behavioral problems, confusion, decreased concentration, dysphoric mood, hallucinations, self-injury, sleep disturbance and suicidal ideas. The patient is not nervous/anxious and is not hyperactive.       I have reviewed the review of systems  above performed by my medical assistant.      Vitals:    21 1125   BP: 128/84   Pulse: 64   SpO2: 98%       Neurologic Exam     Mental Status   Oriented to person, place, and time.   Concentration: normal.   Level of consciousness: alert  Knowledge: consistent with education (No deficits found.).     Cranial Nerves     CN II   Visual fields full to confrontation.     CN III, IV, VI   Pupils are equal, round, and reactive to light.  Extraocular motions are normal.   CN III: no CN III palsy  CN VI: no CN VI palsy    CN V   Facial sensation intact.     CN VII   Facial expression full, symmetric.     CN VIII   CN VIII normal.     CN IX, X   CN IX normal.   CN X normal.     CN XI   CN XI normal.     CN XII   CN XII normal.     Motor Exam     Strength   Right neck flexion: 5/5  Left neck flexion: 5/5  Right neck extension: 5/5  Left neck extension: 5/5  Right deltoid: 5/5  Left deltoid: 5/5  Right biceps: 5/5  Left biceps: 5/5  Right triceps: 5/5  Left triceps: 5/5  Right wrist flexion: 5/5  Left wrist flexion: 5/5  Right wrist extension: 5/5  Left wrist extension: 5/5  Right interossei: 5/5  Left interossei: 5/5  Right abdominals: 5/5  Left abdominals: 5/5  Right iliopsoas: 5/5  Left iliopsoas: 5/5  Right quadriceps: 5/5  Left quadriceps: 5/5  Right hamstrin/5  Left hamstrin/5  Right glutei: 5/5  Left glutei: 5/5  Right anterior tibial: 5/5  Left anterior tibial: 5/5  Right posterior tibial: 5/5  Left posterior tibial: 5/5  Right peroneal: 5/5  Left peroneal: 5/5  Right gastroc: 5/5  Left gastroc: 5/5    Sensory Exam   Light touch normal.   Vibration normal.     Gait, Coordination, and Reflexes     Gait  Gait: normal    Reflexes   Right brachioradialis: 2+  Left brachioradialis: 2+  Right biceps: 2+  Left biceps: 2+  Right triceps: 2+  Left triceps: 2+  Right patellar: 2+  Left patellar: 2+  Right achilles: 2+  Left achilles: 2+  Right : 2+  Left : 2+Station is normal.       Physical Exam  Vitals  reviewed.   Constitutional:       Appearance: She is well-developed.   HENT:      Head: Normocephalic and atraumatic.   Eyes:      Extraocular Movements: EOM normal.      Pupils: Pupils are equal, round, and reactive to light.   Cardiovascular:      Rate and Rhythm: Normal rate and regular rhythm.   Pulmonary:      Breath sounds: Normal breath sounds.   Musculoskeletal:         General: Normal range of motion.   Skin:     General: Skin is warm.   Neurological:      Mental Status: She is oriented to person, place, and time.      Gait: Gait is intact.      Deep Tendon Reflexes:      Reflex Scores:       Tricep reflexes are 2+ on the right side and 2+ on the left side.       Bicep reflexes are 2+ on the right side and 2+ on the left side.       Brachioradialis reflexes are 2+ on the right side and 2+ on the left side.       Patellar reflexes are 2+ on the right side and 2+ on the left side.       Achilles reflexes are 2+ on the right side and 2+ on the left side.    I would like to schedule her for an EMG/nerve conduction study of the left upper and lower extremity.  Since her symptoms are symmetric this will be a good sample.  We will discuss the results of testing via phone.  It sounds as though she likely has a small fiber peripheral neuropathy.  However I feel also that there is an element of fibromyalgia as well.  A total of 25 minutes was spent face-to-face with the patient today.  Of that greater than 50% of this time spent discussing signs and symptoms of paresthesias, myalgia, patient education, plan of care and prognosis.    Procedures    Assessment/Plan:         Diagnoses and all orders for this visit:    1. B12 deficiency (Primary)    2. Myalgia  -     EMG & Nerve Conduction Test; Future    3. Paresthesias  -     EMG & Nerve Conduction Test; Future           Sunny Irizarry II, MD

## 2021-06-01 ENCOUNTER — OFFICE VISIT (OUTPATIENT)
Dept: FAMILY MEDICINE CLINIC | Facility: CLINIC | Age: 61
End: 2021-06-01

## 2021-06-01 VITALS
OXYGEN SATURATION: 97 % | DIASTOLIC BLOOD PRESSURE: 70 MMHG | HEIGHT: 66 IN | SYSTOLIC BLOOD PRESSURE: 129 MMHG | TEMPERATURE: 97.3 F | WEIGHT: 232.9 LBS | BODY MASS INDEX: 37.43 KG/M2 | HEART RATE: 93 BPM

## 2021-06-01 DIAGNOSIS — R73.03 PREDIABETES: Primary | ICD-10-CM

## 2021-06-01 DIAGNOSIS — I10 ESSENTIAL HYPERTENSION: ICD-10-CM

## 2021-06-01 DIAGNOSIS — E53.8 B12 DEFICIENCY: ICD-10-CM

## 2021-06-01 DIAGNOSIS — M25.50 POLYARTHRALGIA: ICD-10-CM

## 2021-06-01 DIAGNOSIS — G89.29 OTHER CHRONIC PAIN: ICD-10-CM

## 2021-06-01 PROCEDURE — 99214 OFFICE O/P EST MOD 30 MIN: CPT | Performed by: NURSE PRACTITIONER

## 2021-06-01 RX ORDER — DULOXETIN HYDROCHLORIDE 30 MG/1
30 CAPSULE, DELAYED RELEASE ORAL DAILY
Qty: 30 CAPSULE | Refills: 1 | Status: SHIPPED | OUTPATIENT
Start: 2021-06-01 | End: 2021-06-18 | Stop reason: DRUGHIGH

## 2021-06-01 NOTE — PROGRESS NOTES
"Answers for HPI/ROS submitted by the patient on 5/31/2021  Please describe your symptoms.: Follow up following neurologist visit and need prescriptions renewed.  Have you had these symptoms before?: Yes  How long have you been having these symptoms?: Greater than 2 weeks  What is the primary reason for your visit?: Other    Chief Complaint  Follow-up (Hypertension and pre diabetes. )    Subjective          Candie Arias presents to [facility]  History of Present Illness patient is here today to follow-up on prediabetes and chronic pain.  She has had ongoing management and B12 injections without any improvement in her pain or fatigue from neurology.  She was told that it may take a while and she is continue take p.o. B12.    She has also continued follow-up with rheumatology without any relief.  She is recently been switched from Celebrex to naproxen and is not finding any relief from this either.  She is not really sure why she was switched.    She knows some of her pain is due to her continued weight gain due to inactivity.  Reports that when her pain is tolerable enough and she is able to get out and do something she loses the whole next day because she has to right breast due to overwhelming fatigue and pain.    Is now having right knee pain but due to poor outcome of left knee replacement is leery to have anything done.  Does not want PT as she did not find it helpful in the past.    Thinks she might have fibromyalgia but has never really been on medication other than the Lyrica which we D tried last year and did not seem to be helpful.    Prediabetes: She is taking and tolerating her Metformin without side effects.  Not really following any particular diet and is not able to exercise due to pain and fatigue.        Objective   Vital Signs:   /70   Pulse 93   Temp 97.3 °F (36.3 °C)   Ht 167.6 cm (66\")   Wt 106 kg (232 lb 14.4 oz)   SpO2 97%   BMI 37.59 kg/m²   "   Physical Exam  Vitals and nursing note reviewed.   Constitutional:       General: She is not in acute distress.     Appearance: She is well-developed. She is obese. She is not ill-appearing or diaphoretic.   HENT:      Head: Normocephalic and atraumatic.   Eyes:      General:         Right eye: No discharge.         Left eye: No discharge.      Conjunctiva/sclera: Conjunctivae normal.   Cardiovascular:      Rate and Rhythm: Normal rate and regular rhythm.      Heart sounds: Normal heart sounds.   Pulmonary:      Effort: Pulmonary effort is normal.      Breath sounds: Normal breath sounds.   Abdominal:      General: Bowel sounds are normal.      Palpations: Abdomen is soft.      Tenderness: There is no abdominal tenderness.   Musculoskeletal:         General: No deformity.      Comments: Antalgic gait secondary to bilateral knee pain and limited range of motion left knee status post TKA   Skin:     General: Skin is warm and dry.   Neurological:      Mental Status: She is alert and oriented to person, place, and time.   Psychiatric:         Mood and Affect: Mood normal.         Behavior: Behavior normal.        Result Review :                 Assessment and Plan    Diagnoses and all orders for this visit:    1. Prediabetes (Primary)  -     Hemoglobin A1c    2. Polyarthralgia    3. B12 deficiency    4. Essential hypertension  -     Basic Metabolic Panel    5. Other chronic pain  -     DULoxetine (CYMBALTA) 30 MG capsule; Take 1 capsule by mouth Daily.  Dispense: 30 capsule; Refill: 1  -     Ambulatory Referral to Behavioral Health        Reviewed neurology notes and plans.  Discussed with patient that nerve healing may take quite a bit of time and recommend continuing therapy.    For polyarthralgias we will continue current naproxen per rheumatology.  Discussed physical therapy, trigger point injections, alternative therapies that may be helpful.  Patient is not interested in trying any other therapy at this time.  I  do strongly recommend counseling to help deal with chronic pain and fatigue.  Discussed chronic nerve pathways and possible benefits of working with counselor to help change pain experience and perceptions.  We will go ahead and switch to Cymbalta and see if this is helpful.  Previously Lyrica at a lower dose was not helpful.  Could consider also gabapentin.    We may need to try many different modalities which each may give her a little bit of benefit but altogether may overall improve her quality of life which we discussed for quite a while today.      We really need to get her moving so that her weight does not continue to increase and cause worsening health sequelae.  This in the and will also help her pain tolerance and perceptions.    Prediabetes: We will continue her Metformin and she will need an A1c the mid part of the month.  We will also check a BMP at that time.    We will have her follow-up in about a month to see how she is doing.  Sooner if any problems.  Side effects of Cymbalta discussed.      Follow Up   Return in about 1 month (around 7/1/2021) for Recheck.  Patient was given instructions and counseling regarding her condition or for health maintenance advice. Please see specific information pulled into the AVS if appropriate.

## 2021-06-14 ENCOUNTER — TELEPHONE (OUTPATIENT)
Dept: FAMILY MEDICINE CLINIC | Facility: CLINIC | Age: 61
End: 2021-06-14

## 2021-06-14 DIAGNOSIS — R73.03 PREDIABETES: ICD-10-CM

## 2021-06-14 RX ORDER — LISINOPRIL AND HYDROCHLOROTHIAZIDE 20; 12.5 MG/1; MG/1
1 TABLET ORAL DAILY
Qty: 30 TABLET | Refills: 0 | Status: SHIPPED | OUTPATIENT
Start: 2021-06-14 | End: 2021-10-06 | Stop reason: SDUPTHER

## 2021-06-14 RX ORDER — LISINOPRIL AND HYDROCHLOROTHIAZIDE 20; 12.5 MG/1; MG/1
1 TABLET ORAL DAILY
Qty: 90 TABLET | Refills: 1 | Status: SHIPPED | OUTPATIENT
Start: 2021-06-14 | End: 2021-06-14 | Stop reason: SDUPTHER

## 2021-06-14 NOTE — TELEPHONE ENCOUNTER
Pt called and stated that her Metformin and lisinopril was supposed to be sent to [facility]. Pt stated that she will be out of town on Friday and will like just a 30 day supply sent to [facility] - [id number] [place] [address] - [phone]  - [phone] FX   [address] [facility] [address]   Phone:  [phone]  Fax:  [phone].    She will need the refill sent to [facility] please advise.

## 2021-06-18 ENCOUNTER — OFFICE VISIT (OUTPATIENT)
Dept: FAMILY MEDICINE CLINIC | Facility: CLINIC | Age: 61
End: 2021-06-18

## 2021-06-18 VITALS — DIASTOLIC BLOOD PRESSURE: 78 MMHG | SYSTOLIC BLOOD PRESSURE: 118 MMHG

## 2021-06-18 DIAGNOSIS — B02.9 HERPES ZOSTER WITHOUT COMPLICATION: Primary | ICD-10-CM

## 2021-06-18 PROCEDURE — 99213 OFFICE O/P EST LOW 20 MIN: CPT | Performed by: NURSE PRACTITIONER

## 2021-06-18 RX ORDER — VALACYCLOVIR HYDROCHLORIDE 1 G/1
1000 TABLET, FILM COATED ORAL 3 TIMES DAILY
Qty: 21 TABLET | Refills: 0 | Status: SHIPPED | OUTPATIENT
Start: 2021-06-18 | End: 2021-06-25

## 2021-06-18 RX ORDER — DULOXETIN HYDROCHLORIDE 60 MG/1
60 CAPSULE, DELAYED RELEASE ORAL DAILY
Qty: 30 CAPSULE | Refills: 3 | Status: SHIPPED | OUTPATIENT
Start: 2021-06-18 | End: 2021-10-06 | Stop reason: SDUPTHER

## 2021-06-18 NOTE — PROGRESS NOTES
Chief Complaint  Rash (c/o poss shingles, rash on R hip to center of back )    Subjective          Candie Arias presents to [facility]  History of Present Illness   Left posterior hip pain 2 days ago and then developed a rash today-thinks she has shingles.  Never had before.  The pain felt different than her normal hip pain.  Today she denies any significant pain, burning, or itching of the rash.     Has been on the cymbalta appr 1 month and not had any side effects and not really noticed any difference in her chronic pain since starting.       No fever, chills, HA, neuropathic sx, abd pain, n/v/d.     Objective   Vital Signs:   /78     Physical Exam  Vitals and nursing note reviewed.   Constitutional:       General: She is not in acute distress.     Appearance: She is well-developed. She is not diaphoretic.   HENT:      Head: Normocephalic and atraumatic.   Eyes:      General:         Right eye: No discharge.         Left eye: No discharge.      Conjunctiva/sclera: Conjunctivae normal.   Cardiovascular:      Rate and Rhythm: Normal rate and regular rhythm.      Heart sounds: Normal heart sounds.   Pulmonary:      Effort: Pulmonary effort is normal.      Breath sounds: Normal breath sounds.   Abdominal:      General: Bowel sounds are normal.      Palpations: Abdomen is soft.      Tenderness: There is no abdominal tenderness.   Musculoskeletal:         General: No deformity.      Comments: Gait smooth and steady   Skin:     General: Skin is warm and dry.      Comments: Zoster appearing rash across left hip starting midline and extending to posterior LCL-no oozing, no s/s complications   Neurological:      General: No focal deficit present.      Mental Status: She is alert and oriented to person, place, and time.   Psychiatric:         Mood and Affect: Mood normal.         Behavior: Behavior normal.        Result Review :                 Assessment and Plan    Diagnoses and  all orders for this visit:    1. Herpes zoster without complication (Primary)    Other orders  -     valACYclovir (Valtrex) 1000 MG tablet; Take 1 tablet by mouth 3 (Three) Times a Day for 7 days.  Dispense: 21 tablet; Refill: 0  -     DULoxetine (CYMBALTA) 60 MG capsule; Take 1 capsule by mouth Daily.  Dispense: 30 capsule; Refill: 3      Valtrex started today for shingles.  No current pain-is on daily anti inflammatory, could increase dose prn for short time prn.  If worsens let me know.  May not have significant pain since her chronic pain is already managed with daily naproxen and she should have a pretty stable blood level.      Since no side effects of cymbalta and not really efficacious at current dose will increase to 60 mg.      S/S complications discussed.            Follow Up   Return if symptoms worsen or fail to improve.  Patient was given instructions and counseling regarding her condition or for health maintenance advice. Please see specific information pulled into the AVS if appropriate.

## 2021-06-18 NOTE — PATIENT INSTRUCTIONS
Shingles    Shingles is an infection. It gives you a painful skin rash and blisters that have fluid in them. Shingles is caused by the same germ (virus) that causes chickenpox.  Shingles only happens in people who:  · Have had chickenpox.  · Have been given a shot of medicine (vaccine) to protect against chickenpox. Shingles is rare in this group.  The first symptoms of shingles may be itching, tingling, or pain in an area on your skin. A rash will show on your skin a few days or weeks later. The rash is likely to be on one side of your body. The rash usually has a shape like a belt or a band. Over time, the rash turns into fluid-filled blisters. The blisters will break open, change into scabs, and dry up. Medicines may:  · Help with pain and itching.  · Help you get better sooner.  · Help to prevent long-term problems.  Follow these instructions at home:  Medicines  · Take over-the-counter and prescription medicines only as told by your doctor.  · Put on an anti-itch cream or numbing cream where you have a rash, blisters, or scabs. Do this as told by your doctor.  Helping with itching and discomfort    · Put cold, wet cloths (cold compresses) on the area of the rash or blisters as told by your doctor.  · Cool baths can help you feel better. Try adding baking soda or dry oatmeal to the water to lessen itching. Do not bathe in hot water.  Blister and rash care  · Keep your rash covered with a loose bandage (dressing).  · Wear loose clothing that does not rub on your rash.  · Keep your rash and blisters clean. To do this, wash the area with mild soap and cool water as told by your doctor.  · Check your rash every day for signs of infection. Check for:  ? More redness, swelling, or pain.  ? Fluid or blood.  ? Warmth.  ? Pus or a bad smell.  · Do not scratch your rash. Do not pick at your blisters. To help you to not scratch:  ? Keep your fingernails clean and cut short.  ? Wear gloves or mittens when you sleep, if  scratching is a problem.  General instructions  · Rest as told by your doctor.  · Keep all follow-up visits as told by your doctor. This is important.  · Wash your hands often with soap and water. If soap and water are not available, use hand . Doing this lowers your chance of getting a skin infection caused by germs (bacteria).  · Your infection can cause chickenpox in people who have never had chickenpox or never got a shot of chickenpox vaccine. If you have blisters that did not change into scabs yet, try not to touch other people or be around other people, especially:  ? Babies.  ? Pregnant women.  ? Children who have areas of red, itchy, or rough skin (eczema).  ? Very old people who have transplants.  ? People who have a long-term (chronic) sickness, like cancer or AIDS.  Contact a doctor if:  · Your pain does not get better with medicine.  · Your pain does not get better after the rash heals.  · You have any signs of infection in the rash area. These signs include:  ? More redness, swelling, or pain around the rash.  ? Fluid or blood coming from the rash.  ? The rash area feeling warm to the touch.  ? Pus or a bad smell coming from the rash.  Get help right away if:  · The rash is on your face or nose.  · You have pain in your face or pain by your eye.  · You lose feeling on one side of your face.  · You have trouble seeing.  · You have ear pain, or you have ringing in your ear.  · You have a loss of taste.  · Your condition gets worse.  Summary  · Shingles gives you a painful skin rash and blisters that have fluid in them.  · Shingles is an infection. It is caused by the same germ (virus) that causes chickenpox.  · Keep your rash covered with a loose bandage (dressing). Wear loose clothing that does not rub on your rash.  · If you have blisters that did not change into scabs yet, try not to touch other people or be around people.  This information is not intended to replace advice given to you by  your health care provider. Make sure you discuss any questions you have with your health care provider.  Document Revised: 04/10/2020 Document Reviewed: 08/22/2018  Elsevier Patient Education © 2021 [employer]

## 2021-07-08 ENCOUNTER — LAB (OUTPATIENT)
Dept: LAB | Facility: HOSPITAL | Age: 61
End: 2021-07-08

## 2021-07-08 LAB
ANION GAP SERPL CALCULATED.3IONS-SCNC: 14 MMOL/L (ref 5–15)
BUN SERPL-MCNC: 19 MG/DL (ref 8–23)
BUN/CREAT SERPL: 24.4 (ref 7–25)
CALCIUM SPEC-SCNC: 9.8 MG/DL (ref 8.6–10.5)
CHLORIDE SERPL-SCNC: 101 MMOL/L (ref 98–107)
CO2 SERPL-SCNC: 26 MMOL/L (ref 22–29)
CREAT SERPL-MCNC: 0.78 MG/DL (ref 0.57–1)
GFR SERPL CREATININE-BSD FRML MDRD: 75 ML/MIN/1.73
GLUCOSE SERPL-MCNC: 117 MG/DL (ref 65–99)
HBA1C MFR BLD: 6.23 % (ref 4.8–5.6)
POTASSIUM SERPL-SCNC: 3.9 MMOL/L (ref 3.5–5.2)
SODIUM SERPL-SCNC: 141 MMOL/L (ref 136–145)

## 2021-07-08 PROCEDURE — 83036 HEMOGLOBIN GLYCOSYLATED A1C: CPT | Performed by: NURSE PRACTITIONER

## 2021-07-08 PROCEDURE — 80048 BASIC METABOLIC PNL TOTAL CA: CPT | Performed by: NURSE PRACTITIONER

## 2021-07-08 PROCEDURE — 36415 COLL VENOUS BLD VENIPUNCTURE: CPT | Performed by: NURSE PRACTITIONER

## 2021-07-13 ENCOUNTER — OFFICE VISIT (OUTPATIENT)
Dept: FAMILY MEDICINE CLINIC | Facility: CLINIC | Age: 61
End: 2021-07-13

## 2021-07-13 VITALS
HEART RATE: 76 BPM | BODY MASS INDEX: 35.71 KG/M2 | SYSTOLIC BLOOD PRESSURE: 120 MMHG | OXYGEN SATURATION: 95 % | TEMPERATURE: 97.3 F | HEIGHT: 66 IN | WEIGHT: 222.2 LBS | DIASTOLIC BLOOD PRESSURE: 69 MMHG

## 2021-07-13 DIAGNOSIS — Z23 NEED FOR TDAP VACCINATION: ICD-10-CM

## 2021-07-13 DIAGNOSIS — I10 ESSENTIAL HYPERTENSION: ICD-10-CM

## 2021-07-13 DIAGNOSIS — E53.8 B12 DEFICIENCY: ICD-10-CM

## 2021-07-13 DIAGNOSIS — R73.03 PREDIABETES: Primary | ICD-10-CM

## 2021-07-13 DIAGNOSIS — Z13.220 SCREENING FOR LIPOID DISORDERS: ICD-10-CM

## 2021-07-13 DIAGNOSIS — M25.50 POLYARTHRALGIA: ICD-10-CM

## 2021-07-13 PROCEDURE — 90471 IMMUNIZATION ADMIN: CPT | Performed by: NURSE PRACTITIONER

## 2021-07-13 PROCEDURE — 99214 OFFICE O/P EST MOD 30 MIN: CPT | Performed by: NURSE PRACTITIONER

## 2021-07-13 PROCEDURE — 90715 TDAP VACCINE 7 YRS/> IM: CPT | Performed by: NURSE PRACTITIONER

## 2021-07-13 NOTE — PROGRESS NOTES
"Chief Complaint  Herpes Zoster (f/u shngles) and Pain (f/u pain all over started new medication)    Subjective          Candie Arias presents to [facility]  History of Present Illness  Feeling better since starting cymbalta-has a little more energy and her pain seems to be a little more controlled.  She is starting to notice less neuropathic sx from B12 injections and is starting to feel encouraged about this.  Knee pain seems a little more manageable right now.     Shingles itches but rash is resolved. No current residual neuropathic sx.     Has been taking and barby metformin for prediabetes and trying to make better eating choices.  Has lost appr 10 lbs since last visit and she is feeling very encouraged and thinks this may also be helping her pain.  Seems to feel full more quickly. No s/s hypo/hyperglycemia.     HTN:  Taking and tolerating meds without side effects.  No chest pain, dizziness, palpitations, ankle swelling, weakness, HA.       Answers for HPI/ROS submitted by the patient on 7/12/2021  Please describe your symptoms.: follow-up: A1C & body pain  Have you had these symptoms before?: Yes  How long have you been having these symptoms?: Greater than 2 weeks  Please list any medications you are currently taking for this condition.: Metformin, Cymbalta  What is the primary reason for your visit?: Other        Objective   Vital Signs:   /69   Pulse 76   Temp 97.3 °F (36.3 °C)   Ht 167.6 cm (66\")   Wt 101 kg (222 lb 3.2 oz)   SpO2 95%   BMI 35.86 kg/m²     Physical Exam  Vitals and nursing note reviewed.   Constitutional:       General: She is not in acute distress.     Appearance: She is well-developed. She is not ill-appearing or diaphoretic.   HENT:      Head: Normocephalic and atraumatic.   Eyes:      General:         Right eye: No discharge.         Left eye: No discharge.      Conjunctiva/sclera: Conjunctivae normal.   Cardiovascular:      Rate and Rhythm: " Normal rate and regular rhythm.      Heart sounds: Normal heart sounds.   Pulmonary:      Effort: Pulmonary effort is normal.      Breath sounds: Normal breath sounds.   Abdominal:      General: Bowel sounds are normal.      Palpations: Abdomen is soft.      Tenderness: There is no abdominal tenderness.   Musculoskeletal:         General: No deformity.      Comments: Gait smooth and steady   Skin:     General: Skin is warm and dry.      Comments: Shingles rash resolved   Neurological:      General: No focal deficit present.      Mental Status: She is alert and oriented to person, place, and time.   Psychiatric:         Mood and Affect: Mood normal.         Behavior: Behavior normal.      Comments: Mood seems improved since last visit.  Seems to be more hopeful and encouraged that she can manage her pain and fatigue         Result Review :   The following data was reviewed by: ALEJANDRO Lorenzo on 07/13/2021:  Most Recent A1C    HGBA1C Most Recent 7/8/21   Hemoglobin A1C 6.23 (A)   (A) Abnormal value                      Assessment and Plan    Diagnoses and all orders for this visit:    1. Prediabetes (Primary)  -     Hemoglobin A1c; Future  -     Lipid Panel With LDL / HDL Ratio; Future    2. Polyarthralgia    3. B12 deficiency    4. Essential hypertension  -     Lipid Panel With LDL / HDL Ratio; Future    5. Need for Tdap vaccination  -     Tdap Vaccine Greater Than or Equal To 6yo IM    6. Screening for lipoid disorders  -     Lipid Panel With LDL / HDL Ratio; Future      Shingles has resolved without residual neuropathic pain.  Recommend vaccine at local pharmacy.     Polyarthralgia seems to be improved since cymbalta increased and she has been able to lose a little weight.  We will continue cymbalta at current dose.     B12 deficiency-she has started to notice some improvement in her neuropathic sx with B12 injections.  She will continue mgmt with Neurology.  This is encouraging!    BP is very good today.   Will continue current meds.  We discussed that if she continues to lose weight we may be able to decrease this dose.      Prediabetes:  Has been stable. Most recent A1C is decreased from previous.  Will continue metformin at current dose for now and recheck A1C in 3 months.  If still trending downward we will recheck in 6 months.              Follow Up   Return in about 3 months (around 10/13/2021), or labs first, for Annual physical.  Patient was given instructions and counseling regarding her condition or for health maintenance advice. Please see specific information pulled into the AVS if appropriate.

## 2021-08-06 ENCOUNTER — APPOINTMENT (OUTPATIENT)
Dept: INFUSION THERAPY | Facility: HOSPITAL | Age: 61
End: 2021-08-06

## 2021-09-29 ENCOUNTER — LAB (OUTPATIENT)
Dept: LAB | Facility: HOSPITAL | Age: 61
End: 2021-09-29

## 2021-09-29 DIAGNOSIS — I10 ESSENTIAL HYPERTENSION: ICD-10-CM

## 2021-09-29 DIAGNOSIS — R73.03 PREDIABETES: ICD-10-CM

## 2021-09-29 DIAGNOSIS — Z13.220 SCREENING FOR LIPOID DISORDERS: ICD-10-CM

## 2021-09-29 LAB
CHOLEST SERPL-MCNC: 213 MG/DL (ref 0–200)
HBA1C MFR BLD: 5.85 % (ref 4.8–5.6)
HDLC SERPL-MCNC: 56 MG/DL (ref 40–60)
LDLC SERPL CALC-MCNC: 132 MG/DL (ref 0–100)
LDLC/HDLC SERPL: 2.3 {RATIO}
TRIGL SERPL-MCNC: 142 MG/DL (ref 0–150)
VLDLC SERPL-MCNC: 25 MG/DL (ref 5–40)

## 2021-09-29 PROCEDURE — 80061 LIPID PANEL: CPT

## 2021-09-29 PROCEDURE — 83036 HEMOGLOBIN GLYCOSYLATED A1C: CPT

## 2021-09-29 PROCEDURE — 36415 COLL VENOUS BLD VENIPUNCTURE: CPT

## 2021-10-06 ENCOUNTER — OFFICE VISIT (OUTPATIENT)
Dept: FAMILY MEDICINE CLINIC | Facility: CLINIC | Age: 61
End: 2021-10-06

## 2021-10-06 VITALS
HEART RATE: 99 BPM | BODY MASS INDEX: 34.12 KG/M2 | HEIGHT: 66 IN | OXYGEN SATURATION: 96 % | WEIGHT: 212.3 LBS | TEMPERATURE: 96.9 F | DIASTOLIC BLOOD PRESSURE: 66 MMHG | SYSTOLIC BLOOD PRESSURE: 126 MMHG

## 2021-10-06 DIAGNOSIS — Z00.00 ROUTINE GENERAL MEDICAL EXAMINATION AT A HEALTH CARE FACILITY: Primary | ICD-10-CM

## 2021-10-06 DIAGNOSIS — G89.29 OTHER CHRONIC PAIN: ICD-10-CM

## 2021-10-06 DIAGNOSIS — M25.50 POLYARTHRALGIA: ICD-10-CM

## 2021-10-06 DIAGNOSIS — I10 ESSENTIAL HYPERTENSION: ICD-10-CM

## 2021-10-06 DIAGNOSIS — R73.03 PREDIABETES: ICD-10-CM

## 2021-10-06 PROBLEM — S86.019A RUPTURE OF ACHILLES TENDON: Status: ACTIVE | Noted: 2021-10-05

## 2021-10-06 PROCEDURE — 99396 PREV VISIT EST AGE 40-64: CPT | Performed by: NURSE PRACTITIONER

## 2021-10-06 RX ORDER — DULOXETIN HYDROCHLORIDE 60 MG/1
60 CAPSULE, DELAYED RELEASE ORAL DAILY
Qty: 90 CAPSULE | Refills: 1 | Status: SHIPPED | OUTPATIENT
Start: 2021-10-06 | End: 2022-03-17

## 2021-10-06 RX ORDER — LISINOPRIL AND HYDROCHLOROTHIAZIDE 20; 12.5 MG/1; MG/1
1 TABLET ORAL DAILY
Qty: 90 TABLET | Refills: 1 | Status: SHIPPED | OUTPATIENT
Start: 2021-10-06 | End: 2022-06-02

## 2021-10-06 NOTE — PROGRESS NOTES
"Chief Complaint  Annual Exam (Physical )    Subjective          Candie Arias presents to [facility]  History of Present Illness patient is here for physical.  She has continued to lose weight and is feeling really good about that.  Cymbalta seems to be improving her mood and energy as well as her shin and bilateral feet pain.  She is followed by rheumatology and taking naproxen which seems to be working well for her joint pain.  She is taking Metformin and tolerating it well.  She has been working on dietary modifications.  She was recently found to have a partial tear in her left Achilles and has been referred to [facility] for this.  She continues to follow with neuro for low B12 and is no longer getting injections.  Not had a recent B12 level done.  Blood pressure seems to be well controlled with current medications which she is taking tolerating without any side effects.    Denies:  fever, chills, fatigue, unintentional weight change, weakness, rash.  No HA, chest pain, palpitations, cough, dyspnea, trouble swallowing, sore throat, ear of nose problems.  No abd pain, n/v/d/constipation, melena, reflux.  No urinary problems, hematuria.  Denies anxiety, depression, insomnia.   PHQ-9 Total Score: 6  JORDAN 7 Total Score: 0        Objective   Vital Signs:   /66   Pulse 99   Temp 96.9 °F (36.1 °C)   Ht 167.6 cm (66\")   Wt 96.3 kg (212 lb 4.8 oz)   SpO2 96%   BMI 34.27 kg/m²     Physical Exam  Vitals and nursing note reviewed.   Constitutional:       General: She is not in acute distress.     Appearance: She is well-developed. She is not ill-appearing.   HENT:      Head: Normocephalic and atraumatic.      Right Ear: Tympanic membrane, ear canal and external ear normal.      Left Ear: Tympanic membrane, ear canal and external ear normal.      Mouth/Throat:      Mouth: Mucous membranes are moist.      Pharynx: Uvula midline. No posterior oropharyngeal erythema.   Eyes: "      General: No scleral icterus.        Right eye: No discharge.         Left eye: No discharge.      Conjunctiva/sclera: Conjunctivae normal.      Pupils: Pupils are equal, round, and reactive to light.   Neck:      Thyroid: No thyromegaly.   Cardiovascular:      Rate and Rhythm: Normal rate and regular rhythm.      Heart sounds: Normal heart sounds. No murmur heard.     Pulmonary:      Effort: Pulmonary effort is normal.      Breath sounds: Normal breath sounds.   Abdominal:      General: Bowel sounds are normal.      Palpations: Abdomen is soft.      Tenderness: There is no abdominal tenderness.   Musculoskeletal:         General: No deformity.      Cervical back: Neck supple.      Comments: Gait smooth and steady   Lymphadenopathy:      Cervical: No cervical adenopathy.   Skin:     General: Skin is warm and dry.   Neurological:      General: No focal deficit present.      Mental Status: She is alert and oriented to person, place, and time.   Psychiatric:         Mood and Affect: Mood normal.         Behavior: Behavior normal.        Result Review :{Labs  Result Review  Imaging  Med Tab  Media  Procedures :23}                 Assessment and Plan    Diagnoses and all orders for this visit:    1. Routine general medical examination at a health care facility (Primary)    2. Essential hypertension  -     lisinopril-hydrochlorothiazide (PRINZIDE,ZESTORETIC) 20-12.5 MG per tablet; Take 1 tablet by mouth Daily.  Dispense: 90 tablet; Refill: 1    3. Prediabetes  -     metFORMIN (GLUCOPHAGE) 500 MG tablet; Take 1 tablet by mouth Daily With Breakfast.  Dispense: 90 tablet; Refill: 1    4. Polyarthralgia  -     DULoxetine (CYMBALTA) 60 MG capsule; Take 1 capsule by mouth Daily.  Dispense: 90 capsule; Refill: 1    5. Other chronic pain  -     DULoxetine (CYMBALTA) 60 MG capsule; Take 1 capsule by mouth Daily.  Dispense: 90 capsule; Refill: 1       As part of wellness and prevention, the following topics were discussed  with the patient:   Nutrition-diet high in fresh/fozen veges, lower in carbs, unsaturated fats, and higher in lean proteins, adequate hydration   Physical activity/regular exercise-150 mins per week of moderate activity that increases HR and is enjoyable to lower stress and improve CVD     Healthy weight-goal BMI discussed    Injury prevention-covid safety, back and joint health   Dental health-recommend twice per year dental cleans and daily flossing to lower inflammation in body   Mental health-stress mgmt and sleep hygiene   Immunization-recommend covid booster vaccine when available, planning to get her shingles vaccines. Declines flu here today.  UTD on cologuard-due next yr  Needs pap and recommend f/u with GYN, no mammos needed    Hypertension is well controlled and will continue current medications.     Prediabetes: Last A1c was a little decreased.  We will continue current dose of Metformin.  Patient seems to be doing very well on this.  Continues to lose weight.    Chronic pain seems to be greatly improved with Cymbalta and will continue.          Follow Up   Return in about 6 months (around 4/6/2022) for Recheck.  Patient was given instructions and counseling regarding her condition or for health maintenance advice. Please see specific information pulled into the AVS if appropriate.

## 2021-10-15 ENCOUNTER — HOSPITAL ENCOUNTER (OUTPATIENT)
Dept: INFUSION THERAPY | Facility: HOSPITAL | Age: 61
Discharge: HOME | End: 2021-10-15
Admitting: PSYCHIATRY & NEUROLOGY

## 2021-10-15 DIAGNOSIS — M79.10 MYALGIA: ICD-10-CM

## 2021-10-15 DIAGNOSIS — R20.2 PARESTHESIAS: ICD-10-CM

## 2021-10-15 PROCEDURE — 95911 NRV CNDJ TEST 9-10 STUDIES: CPT

## 2021-10-15 PROCEDURE — 95911 NRV CNDJ TEST 9-10 STUDIES: CPT | Performed by: PSYCHIATRY & NEUROLOGY

## 2021-10-15 PROCEDURE — 95886 MUSC TEST DONE W/N TEST COMP: CPT | Performed by: PSYCHIATRY & NEUROLOGY

## 2021-10-15 PROCEDURE — 95886 MUSC TEST DONE W/N TEST COMP: CPT

## 2021-10-15 NOTE — PROCEDURES
EMG and Nerve Conduction Studies    I.      Instrument used: Neuromax 1002  II.     Please see data sheets for tabular summary of NCS and details on methods, temperatures and lab standards.   III.    EMG muscles tested for upper extremity studies include the deltoid, biceps, triceps, pronator teres, extensor digitorum communis, first dorsal interosseous and abductor pollicis brevis.    IV.   EMG muscles tested for lower extremity studies include the vastus lateralis, tibialis anterior, peroneus longus, medial gastrocnemius and extensor digitorum brevis.    V.    Additional muscles tested as needed.  Paraspinal muscles tested as needed.   VI.   Please see data sheets for tabular summary of EMG findings.   VII. The complete report includes the data sheets.      Indication: Numbness in the last 2 digits in both hands and the last digit in both feet  History: 61-year-old woman with numbness in the last 2 digits of both hands and the last digit in both feet.  She also describes some general soft tissue aching which has improved some on Cymbalta.      Ht: 167.6 cm  Wt: 96.3 kg; BMI 34.27  HbA1C:   Lab Results   Component Value Date    HGBA1C 5.85 (H) 09/29/2021     TSH:   Lab Results   Component Value Date    TSH 1.260 07/14/2020       Technical summary:  Nerve conduction studies were obtained in the left arm and left leg.  Skin temperature of the left palm was at least 32 °C.  At the left ankle the temperature was very low and temperature correction was used where indicated.  Needle examination was obtained on selected muscles in the left arm and left leg.    Results:  1.  Normal left median antidromic sensory distal latency and amplitude.  2.  Normal left ulnar antidromic sensory distal latency and amplitude.  3.  Normal left radial sensory distal latency and amplitude.  4.  Normal left median motor conduction velocity, distal latency and amplitudes.  5.  Normal left ulnar motor conduction velocities, distal latency and  amplitudes.  6.  Normal left sural sensory distal latency and amplitude.  7.  Normal left superficial peroneal sensory distal latency and amplitude.  8.  Normal left peroneal motor conduction velocities, distal latency and amplitudes.  9.  Normal left tibial motor conduction velocity, distal latency and amplitudes.  10.  Needle examination of selected muscles in the left arm and left leg showed fibrillations and positive sharp waves in the extensor digitorum brevis with normal-appearing motor units but reduced recruitment.  All other muscles tested in the left arm and left leg showed normal insertional activities, motor units and recruitment patterns.  Lumbar paraspinals at L5 showed no abnormality.    Impression:  Abnormal study showing isolated denervation changes in the left extensor digitorum brevis.  There were no additional findings to suggest this was from a peroneal neuropathy or an L5 or S1 radiculopathy.  No evidence of a myopathy was seen.  Clinical correlation is suggested.  Study results were discussed with the patient.    Almas Abrams M.D.              Dictated utilizing Dragon dictation.

## 2022-03-16 DIAGNOSIS — G89.29 OTHER CHRONIC PAIN: ICD-10-CM

## 2022-03-16 DIAGNOSIS — M25.50 POLYARTHRALGIA: ICD-10-CM

## 2022-03-17 RX ORDER — DULOXETIN HYDROCHLORIDE 60 MG/1
60 CAPSULE, DELAYED RELEASE ORAL DAILY
Qty: 90 CAPSULE | Refills: 1 | Status: SHIPPED | OUTPATIENT
Start: 2022-03-17 | End: 2022-09-08

## 2022-03-29 ENCOUNTER — TELEPHONE (OUTPATIENT)
Dept: FAMILY MEDICINE CLINIC | Facility: CLINIC | Age: 62
End: 2022-03-29

## 2022-03-29 DIAGNOSIS — I10 ESSENTIAL HYPERTENSION: Primary | ICD-10-CM

## 2022-03-29 DIAGNOSIS — E53.8 B12 DEFICIENCY: ICD-10-CM

## 2022-03-29 DIAGNOSIS — R73.03 PREDIABETES: ICD-10-CM

## 2022-03-29 DIAGNOSIS — M25.50 POLYARTHRALGIA: ICD-10-CM

## 2022-03-29 NOTE — TELEPHONE ENCOUNTER
Caller: Candie Arias    Relationship: Self    Best call back number: [phone]    What orders are you requesting (i.e. lab or imaging): BLOOD WORK WITH B12     In what timeframe would the patient need to come in: WAS PLANNING TO GO TOMORROW     Where will you receive your lab/imaging services: [facility]    Additional notes: ATTEMPTED TO CALL OFFICE TO CONFIRM WITH PATIENT. STATES SHE WAS TOLD TO CALL TO MAKE SURE THIS WAS SENT TO [facility]

## 2022-03-30 ENCOUNTER — LAB (OUTPATIENT)
Dept: LAB | Facility: HOSPITAL | Age: 62
End: 2022-03-30

## 2022-03-30 ENCOUNTER — TRANSCRIBE ORDERS (OUTPATIENT)
Dept: ADMINISTRATIVE | Facility: HOSPITAL | Age: 62
End: 2022-03-30

## 2022-03-30 DIAGNOSIS — Z79.1 ENCOUNTER FOR LONG-TERM (CURRENT) USE OF NON-STEROIDAL ANTI-INFLAMMATORIES: ICD-10-CM

## 2022-03-30 DIAGNOSIS — E53.8 B12 DEFICIENCY: ICD-10-CM

## 2022-03-30 DIAGNOSIS — R76.0 RAISED ANTIBODY TITER: ICD-10-CM

## 2022-03-30 DIAGNOSIS — I10 ESSENTIAL HYPERTENSION: ICD-10-CM

## 2022-03-30 DIAGNOSIS — R73.03 PREDIABETES: ICD-10-CM

## 2022-03-30 DIAGNOSIS — M13.0 POLYARTHRITIS: Primary | ICD-10-CM

## 2022-03-30 DIAGNOSIS — M13.0 POLYARTHRITIS: ICD-10-CM

## 2022-03-30 LAB
ALBUMIN SERPL-MCNC: 4.6 G/DL (ref 3.5–5.2)
ALBUMIN/GLOB SERPL: 1.9 G/DL
ALP SERPL-CCNC: 78 U/L (ref 39–117)
ALT SERPL W P-5'-P-CCNC: 14 U/L (ref 1–33)
ANION GAP SERPL CALCULATED.3IONS-SCNC: 10.9 MMOL/L (ref 5–15)
AST SERPL-CCNC: 17 U/L (ref 1–32)
BASOPHILS # BLD AUTO: 0.05 10*3/MM3 (ref 0–0.2)
BASOPHILS NFR BLD AUTO: 0.8 % (ref 0–1.5)
BILIRUB SERPL-MCNC: 0.5 MG/DL (ref 0–1.2)
BUN SERPL-MCNC: 21 MG/DL (ref 8–23)
BUN/CREAT SERPL: 26.9 (ref 7–25)
CALCIUM SPEC-SCNC: 10 MG/DL (ref 8.6–10.5)
CHLORIDE SERPL-SCNC: 104 MMOL/L (ref 98–107)
CHOLEST SERPL-MCNC: 226 MG/DL (ref 0–200)
CO2 SERPL-SCNC: 25.1 MMOL/L (ref 22–29)
CREAT SERPL-MCNC: 0.78 MG/DL (ref 0.57–1)
CRP SERPL-MCNC: 0.34 MG/DL (ref 0–0.5)
DEPRECATED RDW RBC AUTO: 41.2 FL (ref 37–54)
EGFRCR SERPLBLD CKD-EPI 2021: 85.5 ML/MIN/1.73
EOSINOPHIL # BLD AUTO: 0.14 10*3/MM3 (ref 0–0.4)
EOSINOPHIL NFR BLD AUTO: 2.3 % (ref 0.3–6.2)
ERYTHROCYTE [DISTWIDTH] IN BLOOD BY AUTOMATED COUNT: 12.7 % (ref 12.3–15.4)
GLOBULIN UR ELPH-MCNC: 2.4 GM/DL
GLUCOSE SERPL-MCNC: 109 MG/DL (ref 65–99)
HBA1C MFR BLD: 5.9 % (ref 4.8–5.6)
HCT VFR BLD AUTO: 44.1 % (ref 34–46.6)
HDLC SERPL-MCNC: 61 MG/DL (ref 40–60)
HGB BLD-MCNC: 14.1 G/DL (ref 12–15.9)
IMM GRANULOCYTES # BLD AUTO: 0.03 10*3/MM3 (ref 0–0.05)
IMM GRANULOCYTES NFR BLD AUTO: 0.5 % (ref 0–0.5)
LDLC SERPL CALC-MCNC: 137 MG/DL (ref 0–100)
LDLC/HDLC SERPL: 2.18 {RATIO}
LYMPHOCYTES # BLD AUTO: 1.23 10*3/MM3 (ref 0.7–3.1)
LYMPHOCYTES NFR BLD AUTO: 20.2 % (ref 19.6–45.3)
MCH RBC QN AUTO: 28.5 PG (ref 26.6–33)
MCHC RBC AUTO-ENTMCNC: 32 G/DL (ref 31.5–35.7)
MCV RBC AUTO: 89.1 FL (ref 79–97)
MONOCYTES # BLD AUTO: 0.41 10*3/MM3 (ref 0.1–0.9)
MONOCYTES NFR BLD AUTO: 6.7 % (ref 5–12)
NEUTROPHILS NFR BLD AUTO: 4.23 10*3/MM3 (ref 1.7–7)
NEUTROPHILS NFR BLD AUTO: 69.5 % (ref 42.7–76)
NRBC BLD AUTO-RTO: 0 /100 WBC (ref 0–0.2)
PLATELET # BLD AUTO: 338 10*3/MM3 (ref 140–450)
PMV BLD AUTO: 8.9 FL (ref 6–12)
POTASSIUM SERPL-SCNC: 4.6 MMOL/L (ref 3.5–5.2)
PROT SERPL-MCNC: 7 G/DL (ref 6–8.5)
RBC # BLD AUTO: 4.95 10*6/MM3 (ref 3.77–5.28)
SODIUM SERPL-SCNC: 140 MMOL/L (ref 136–145)
TRIGL SERPL-MCNC: 159 MG/DL (ref 0–150)
VIT B12 BLD-MCNC: 1844 PG/ML (ref 211–946)
VLDLC SERPL-MCNC: 28 MG/DL (ref 5–40)
WBC NRBC COR # BLD: 6.09 10*3/MM3 (ref 3.4–10.8)

## 2022-03-30 PROCEDURE — 86140 C-REACTIVE PROTEIN: CPT

## 2022-03-30 PROCEDURE — 80061 LIPID PANEL: CPT

## 2022-03-30 PROCEDURE — 80053 COMPREHEN METABOLIC PANEL: CPT

## 2022-03-30 PROCEDURE — 83036 HEMOGLOBIN GLYCOSYLATED A1C: CPT

## 2022-03-30 PROCEDURE — 36415 COLL VENOUS BLD VENIPUNCTURE: CPT

## 2022-03-30 PROCEDURE — 82607 VITAMIN B-12: CPT

## 2022-03-30 PROCEDURE — 85025 COMPLETE CBC W/AUTO DIFF WBC: CPT

## 2022-04-06 ENCOUNTER — OFFICE VISIT (OUTPATIENT)
Dept: FAMILY MEDICINE CLINIC | Facility: CLINIC | Age: 62
End: 2022-04-06

## 2022-04-06 VITALS
SYSTOLIC BLOOD PRESSURE: 126 MMHG | HEART RATE: 111 BPM | HEIGHT: 66 IN | DIASTOLIC BLOOD PRESSURE: 75 MMHG | TEMPERATURE: 96.6 F | WEIGHT: 205 LBS | BODY MASS INDEX: 32.95 KG/M2 | OXYGEN SATURATION: 98 %

## 2022-04-06 DIAGNOSIS — I10 ESSENTIAL HYPERTENSION: ICD-10-CM

## 2022-04-06 DIAGNOSIS — R73.03 PREDIABETES: ICD-10-CM

## 2022-04-06 DIAGNOSIS — M79.7 FIBROMYALGIA: Primary | ICD-10-CM

## 2022-04-06 DIAGNOSIS — M25.50 POLYARTHRALGIA: ICD-10-CM

## 2022-04-06 PROCEDURE — 99214 OFFICE O/P EST MOD 30 MIN: CPT | Performed by: NURSE PRACTITIONER

## 2022-04-06 RX ORDER — DICLOFENAC SODIUM 75 MG/1
TABLET, DELAYED RELEASE ORAL
COMMUNITY
Start: 2022-03-24 | End: 2022-07-06

## 2022-04-06 RX ORDER — DULOXETIN HYDROCHLORIDE 30 MG/1
30 CAPSULE, DELAYED RELEASE ORAL DAILY
Qty: 30 CAPSULE | Refills: 1 | Status: SHIPPED | OUTPATIENT
Start: 2022-04-06 | End: 2022-06-02

## 2022-04-06 NOTE — PROGRESS NOTES
"Chief Complaint  Hypertension (6 month f/u htn )    Subjective          Candie Arias presents to [facility]  History of Present Illness pt is here for f/u on HTN and fibromyalgia.  She had been doing well on cymbalta, but does not feel like it is working as well.  She is starting to have more joint pain and tingling in extremities.  Also more fatigued.      Rheumatology has not been helping with fibromyalgia-switched to diclofenac and is not helpful-plans to switch back to naproxen.     She is no longer followed by neurology since B12 supplementation complete and is now adequate.  She is just taking p.o.  She had EMG in the past which was not really conclusive for cause of neuropathic symptoms.    While feeling better she was able to lose 25 pounds working on diet.  She has been trying to eat in moderation.  She still is not able to exercise due to chronic polyarthralgia and problems with left knee since surgery.  Not seen ortho in about 2 yrs. Does not feel like it will be helpful to go back.  She did not respond to PT and refuses another try.         Objective   Vital Signs:   /75   Pulse 111   Temp 96.6 °F (35.9 °C)   Ht 167.6 cm (66\")   Wt 93 kg (205 lb)   SpO2 98%   BMI 33.09 kg/m²     BMI is above normal parameters. Recommendations: exercise counseling/recommendations and nutrition counseling/recommendations       Physical Exam  Vitals and nursing note reviewed.   Constitutional:       General: She is not in acute distress.     Appearance: She is well-developed. She is not ill-appearing or diaphoretic.   HENT:      Head: Normocephalic and atraumatic.   Eyes:      General:         Right eye: No discharge.         Left eye: No discharge.      Conjunctiva/sclera: Conjunctivae normal.   Pulmonary:      Effort: Pulmonary effort is normal.   Musculoskeletal:         General: No deformity.      Comments: Gait smooth and steady   Skin:     General: Skin is warm and dry. "   Neurological:      General: No focal deficit present.      Mental Status: She is alert and oriented to person, place, and time.   Psychiatric:         Mood and Affect: Mood normal.         Behavior: Behavior normal.        Result Review :                 Assessment and Plan    Diagnoses and all orders for this visit:    1. Fibromyalgia (Primary)  -     DULoxetine (Cymbalta) 30 MG capsule; Take 1 capsule by mouth Daily. Take with 60 mg for total dose of 90 mg for fibromyalgia  Dispense: 30 capsule; Refill: 1    2. Essential hypertension    3. Polyarthralgia    4. Prediabetes    I am not sure that increasing cymbalta will be that helpful.  Also, may have more side effects. Will increase to 90mg and see if improvement.  She did not get improvement with lyrica.     Recommend contacting ortho and then PT-pt is not willing to start PT again.  She is agreeable to look into yoga.      Discussed that exercise and movement will help fibromyalgia.  meds may quit working over time.  Other tx modalities and combos of therapy often needed.      HTN controlled.  Continue current med and dose.     Prediabetes stable.  Continue current low dose metformin.  Recheck in 6 months.      B12 is adequate.  Continue current supplement for now.       Follow Up {Instructions Charge Capture  Follow-up Communications :23}  Return in about 3 months (around 7/6/2022) for Recheck.  Patient was given instructions and counseling regarding her condition or for health maintenance advice. Please see specific information pulled into the AVS if appropriate.

## 2022-06-01 DIAGNOSIS — R73.03 PREDIABETES: ICD-10-CM

## 2022-06-01 DIAGNOSIS — M79.7 FIBROMYALGIA: ICD-10-CM

## 2022-06-01 DIAGNOSIS — I10 ESSENTIAL HYPERTENSION: ICD-10-CM

## 2022-06-02 RX ORDER — DULOXETIN HYDROCHLORIDE 30 MG/1
30 CAPSULE, DELAYED RELEASE ORAL DAILY
Qty: 90 CAPSULE | Refills: 1 | Status: SHIPPED | OUTPATIENT
Start: 2022-06-02 | End: 2022-09-08

## 2022-06-02 RX ORDER — LISINOPRIL AND HYDROCHLOROTHIAZIDE 20; 12.5 MG/1; MG/1
1 TABLET ORAL DAILY
Qty: 90 TABLET | Refills: 1 | Status: SHIPPED | OUTPATIENT
Start: 2022-06-02 | End: 2022-07-06 | Stop reason: SDUPTHER

## 2022-06-02 NOTE — TELEPHONE ENCOUNTER
Rx Refill Note  Requested Prescriptions     Pending Prescriptions Disp Refills   • metFORMIN (GLUCOPHAGE) 500 MG tablet [Pharmacy Med Name: metFORMIN HCl 500 MG Oral Tablet] 90 tablet 1     Sig: TAKE 1 TABLET BY MOUTH  DAILY WITH BREAKFAST   • DULoxetine (CYMBALTA) 30 MG capsule [Pharmacy Med Name: DULoxetine HCl 30 MG Oral Capsule Delayed Release Particles] 30 capsule 11     Sig: TAKE 1 CAPSULE BY MOUTH  DAILY TAKE WITH 60 MG FOR  TOTAL DOSE OF 90 MG FOR  FIBROMYALGIA   • lisinopril-hydrochlorothiazide (PRINZIDE,ZESTORETIC) 20-12.5 MG per tablet [Pharmacy Med Name: Lisinopril-hydroCHLOROthiazide 20-12.5 MG Oral Tablet] 90 tablet 1     Sig: TAKE 1 TABLET BY MOUTH  DAILY      Last office visit with prescribing clinician: 4/6/2022      Next office visit with prescribing clinician: 7/6/2022   {TIP  Encounters:23}         Bernadette Adams MA  06/02/22, 09:10 EDT

## 2022-07-06 ENCOUNTER — OFFICE VISIT (OUTPATIENT)
Dept: FAMILY MEDICINE CLINIC | Facility: CLINIC | Age: 62
End: 2022-07-06

## 2022-07-06 VITALS
OXYGEN SATURATION: 97 % | SYSTOLIC BLOOD PRESSURE: 112 MMHG | BODY MASS INDEX: 32.78 KG/M2 | HEIGHT: 66 IN | WEIGHT: 204 LBS | DIASTOLIC BLOOD PRESSURE: 71 MMHG | HEART RATE: 93 BPM | TEMPERATURE: 96.4 F

## 2022-07-06 DIAGNOSIS — M79.7 FIBROMYALGIA: Primary | ICD-10-CM

## 2022-07-06 DIAGNOSIS — E53.8 B12 DEFICIENCY: ICD-10-CM

## 2022-07-06 DIAGNOSIS — Z12.11 SCREEN FOR COLON CANCER: ICD-10-CM

## 2022-07-06 DIAGNOSIS — R73.03 PREDIABETES: ICD-10-CM

## 2022-07-06 DIAGNOSIS — I10 ESSENTIAL HYPERTENSION: ICD-10-CM

## 2022-07-06 PROCEDURE — 99214 OFFICE O/P EST MOD 30 MIN: CPT | Performed by: NURSE PRACTITIONER

## 2022-07-06 RX ORDER — NAPROXEN 500 MG/1
TABLET ORAL
COMMUNITY
Start: 2022-03-24 | End: 2022-07-06 | Stop reason: SDUPTHER

## 2022-07-06 RX ORDER — NAPROXEN 500 MG/1
500 TABLET ORAL 2 TIMES DAILY WITH MEALS
Qty: 180 TABLET | Refills: 1 | Status: SHIPPED | OUTPATIENT
Start: 2022-07-06 | End: 2022-11-28

## 2022-07-06 RX ORDER — LISINOPRIL AND HYDROCHLOROTHIAZIDE 20; 12.5 MG/1; MG/1
1 TABLET ORAL DAILY
Qty: 90 TABLET | Refills: 1 | Status: SHIPPED | OUTPATIENT
Start: 2022-07-06 | End: 2022-12-21

## 2022-07-06 NOTE — PROGRESS NOTES
Chief Complaint  Fibromyalgia (F/u fibromyalgia ) and Hypertension (F/u htn )    Subjective        Candie Arias presents to [facility]  History of Present Illness patient is here for follow-up on fibromyalgia, low B12, prediabetes and hypertension.    Fibromyalgia: Has done better with dose increase on Cymbalta.  Still has quite a bit of fatigue but has been able to do some ADLs.  Heat seems to worsen both pain and fatigue.  But she does feel better in the summer and warmer months.  She was hoping to be more pain-free then she has but agrees that she is better than she was when she initially started treatment.  Her symptoms seem to wax and wane.  Not having any side effects on that Cymbalta.  Would like to continue.    She has been wearing a compression brace on her left knee recently which seems to help her pain.  She continues naproxen twice a day that was prescribed originally by rheumatology.  Takes with food.  This works better than other medications and would like to continue.  She prefers not to have to go back to rheumatology for refills and would like to get them here.  She has not tried taking them daily after blood level dizzy if she could manage her pain with less medication.  She has no abdominal discomfort, no melena, no excessive bruising.  She has been on naproxen for about the last 6 months.    She has not had follow-up again with neurology for low B12.  Feels like it is pretty stable.  Most of her neuropathic symptoms are either lessened or wax and wane.    Hypertension: Taking tolerating blood pressure medication without any side effects.  Denies chest pain, palpitation, headache, dizziness, cough, dyspnea, leg swelling.    Prediabetes: She is taking and tolerating metformin.  Has been trying to lose weight and initially did which was very encouraging when she started the Cymbalta but seem to have plateaued.  She is trying to be a little bit more active but  "finds it difficult with chronic pain.  Trying to be mindful of food choices.    She has been having some hot flashes recently which she has not had in quite a while.  Wonders if she needs to have her hormones checked.      Objective   Vital Signs:  /71   Pulse 93   Temp 96.4 °F (35.8 °C)   Ht 167.6 cm (66\")   Wt 92.5 kg (204 lb)   SpO2 97%   BMI 32.93 kg/m²   Estimated body mass index is 32.93 kg/m² as calculated from the following:    Height as of this encounter: 167.6 cm (66\").    Weight as of this encounter: 92.5 kg (204 lb).    BMI is >= 30 and <35. (Class 1 Obesity). The following options were offered after discussion;: weight loss educational material (shared in after visit summary)      Physical Exam  Vitals and nursing note reviewed.   Constitutional:       General: She is not in acute distress.     Appearance: She is well-developed. She is not ill-appearing or diaphoretic.      Comments: Well dressed   HENT:      Head: Normocephalic and atraumatic.   Eyes:      General:         Right eye: No discharge.         Left eye: No discharge.      Conjunctiva/sclera: Conjunctivae normal.   Cardiovascular:      Rate and Rhythm: Normal rate and regular rhythm.      Heart sounds: Normal heart sounds.   Pulmonary:      Effort: Pulmonary effort is normal.      Breath sounds: Normal breath sounds.   Abdominal:      General: Bowel sounds are normal.      Palpations: Abdomen is soft.      Tenderness: There is no abdominal tenderness.   Musculoskeletal:         General: No deformity.      Comments: Gait smooth and steady   Skin:     General: Skin is warm and dry.   Neurological:      General: No focal deficit present.      Mental Status: She is alert and oriented to person, place, and time.   Psychiatric:         Mood and Affect: Mood normal.         Behavior: Behavior normal.         Thought Content: Thought content normal.        Result Review :                Assessment and Plan   Diagnoses and all orders for " this visit:    1. Fibromyalgia (Primary)  -     naproxen (NAPROSYN) 500 MG tablet; Take 1 tablet by mouth 2 (Two) Times a Day With Meals.  Dispense: 180 tablet; Refill: 1    2. Essential hypertension  -     lisinopril-hydrochlorothiazide (PRINZIDE,ZESTORETIC) 20-12.5 MG per tablet; Take 1 tablet by mouth Daily.  Dispense: 90 tablet; Refill: 1  -     Basic Metabolic Panel  -     Microalbumin / Creatinine Urine Ratio - Urine, Clean Catch  -     TSH Rfx On Abnormal To Free T4    3. B12 deficiency  -     Vitamin B12    4. Prediabetes  -     metFORMIN (GLUCOPHAGE) 500 MG tablet; Take 1 tablet by mouth Daily With Breakfast.  Dispense: 90 tablet; Refill: 1  -     Hemoglobin A1c    5. Screen for colon cancer  -     Cologuard - Stool, Per Rectum; Future    Fibromyalgia: Seems to be pretty well controlled with current naproxen as well as Cymbalta.  Tolerating both well.  Discussed that when she has a blood level with naproxen she may try to decrease to daily dosing every other day rather than twice daily to see if she can decrease her NSAID use.  Side effects of NSAIDs discussed as well as cautions.  We will need regular labs for therapeutic monitoring.    Hypertension: Blood pressure is excellent today.  We will continue current medications.  Needs labs today.    B12 deficiency: Reviewed last neuro note and B12.  Seems to be remaining stable.  We will check B12 today since her fatigue seems to be a little bit more.    Prediabetes: A1c has been stable with current dose of metformin.  We will continue.  Discussed other medication options that may help with weight loss if she is interested.    She is due her Cologuard and we will order that.    Overall think she is doing very well.    Recommend follow-up with GYN for hot flashes as well as making sure her Pap is up-to-date.       Follow Up   Return in about 6 months (around 1/6/2023) for Recheck.  Patient was given instructions and counseling regarding her condition or for  health maintenance advice. Please see specific information pulled into the AVS if appropriate.       Answers for HPI/ROS submitted by the patient on 7/5/2022  Please describe your symptoms.: 3 month follow up of Duloxetine for fibromyalgia.  Have you had these symptoms before?: Yes  How long have you been having these symptoms?: Greater than 2 weeks  Please list any medications you are currently taking for this condition.: Duloxetine 60mg plus 30 mg, once a day.  What is the primary reason for your visit?: Other

## 2022-07-07 LAB
ALBUMIN/CREAT UR: 7 MG/G CREAT (ref 0–29)
BUN SERPL-MCNC: 24 MG/DL (ref 8–27)
BUN/CREAT SERPL: 30 (ref 12–28)
CALCIUM SERPL-MCNC: 10.3 MG/DL (ref 8.7–10.3)
CHLORIDE SERPL-SCNC: 103 MMOL/L (ref 96–106)
CO2 SERPL-SCNC: 24 MMOL/L (ref 20–29)
CREAT SERPL-MCNC: 0.81 MG/DL (ref 0.57–1)
CREAT UR-MCNC: 161.6 MG/DL
EGFRCR SERPLBLD CKD-EPI 2021: 82 ML/MIN/1.73
GLUCOSE SERPL-MCNC: 95 MG/DL (ref 65–99)
HBA1C MFR BLD: 6 % (ref 4.8–5.6)
MICROALBUMIN UR-MCNC: 10.6 UG/ML
POTASSIUM SERPL-SCNC: 5.3 MMOL/L (ref 3.5–5.2)
SODIUM SERPL-SCNC: 142 MMOL/L (ref 134–144)
TSH SERPL DL<=0.005 MIU/L-ACNC: 1.01 UIU/ML (ref 0.45–4.5)
VIT B12 SERPL-MCNC: 979 PG/ML (ref 232–1245)

## 2022-07-26 NOTE — TELEPHONE ENCOUNTER
Orders place    
Will you place HA1c lab order in pt's chart.    She will go to the 1st floor lab to have it drawn.    Pls advise.  
Statement Selected

## 2022-09-07 DIAGNOSIS — M79.7 FIBROMYALGIA: ICD-10-CM

## 2022-09-07 DIAGNOSIS — G89.29 OTHER CHRONIC PAIN: ICD-10-CM

## 2022-09-07 DIAGNOSIS — M25.50 POLYARTHRALGIA: ICD-10-CM

## 2022-09-07 NOTE — TELEPHONE ENCOUNTER
Rx Refill Note  Requested Prescriptions     Pending Prescriptions Disp Refills   • DULoxetine (CYMBALTA) 30 MG capsule [Pharmacy Med Name: DULoxetine HCl 30 MG Oral Capsule Delayed Release Particles] 90 capsule 3     Sig: TAKE 1 CAPSULE BY MOUTH  DAILY WITH 60 MG FOR TOTAL  DOSE OF 90 MG FOR  FIBROMYALGIA   • DULoxetine (CYMBALTA) 60 MG capsule [Pharmacy Med Name: DULoxetine HCl 60 MG Oral Capsule Delayed Release Particles] 90 capsule 3     Sig: TAKE 1 CAPSULE BY MOUTH  DAILY      Last office visit with prescribing clinician: 7/6/2022      Next office visit with prescribing clinician: Visit date not found       {TIP  Please add Last Relevant Lab Date if appropriate:23}     Bernadette Adams MA  09/07/22, 16:13 EDT

## 2022-09-08 RX ORDER — DULOXETIN HYDROCHLORIDE 60 MG/1
60 CAPSULE, DELAYED RELEASE ORAL DAILY
Qty: 90 CAPSULE | Refills: 1 | Status: SHIPPED | OUTPATIENT
Start: 2022-09-08

## 2022-09-08 RX ORDER — DULOXETIN HYDROCHLORIDE 30 MG/1
CAPSULE, DELAYED RELEASE ORAL
Qty: 90 CAPSULE | Refills: 1 | Status: SHIPPED | OUTPATIENT
Start: 2022-09-08

## 2022-11-24 DIAGNOSIS — M79.7 FIBROMYALGIA: ICD-10-CM

## 2022-11-28 RX ORDER — NAPROXEN 500 MG/1
TABLET ORAL
Qty: 180 TABLET | Refills: 0 | Status: SHIPPED | OUTPATIENT
Start: 2022-11-28 | End: 2023-02-15

## 2022-12-21 DIAGNOSIS — R73.03 PREDIABETES: ICD-10-CM

## 2022-12-21 DIAGNOSIS — I10 ESSENTIAL HYPERTENSION: ICD-10-CM

## 2022-12-21 RX ORDER — LISINOPRIL AND HYDROCHLOROTHIAZIDE 20; 12.5 MG/1; MG/1
1 TABLET ORAL DAILY
Qty: 90 TABLET | Refills: 0 | Status: SHIPPED | OUTPATIENT
Start: 2022-12-21 | End: 2023-03-03

## 2022-12-27 DIAGNOSIS — R19.5 POSITIVE COLORECTAL CANCER SCREENING USING COLOGUARD TEST: Primary | ICD-10-CM

## 2023-01-25 ENCOUNTER — TELEPHONE (OUTPATIENT)
Dept: FAMILY MEDICINE CLINIC | Facility: CLINIC | Age: 63
End: 2023-01-25
Payer: COMMERCIAL

## 2023-01-25 DIAGNOSIS — Z12.11 ENCOUNTER FOR SCREENING COLONOSCOPY: Primary | ICD-10-CM

## 2023-01-25 DIAGNOSIS — R19.5 POSITIVE COLORECTAL CANCER SCREENING USING COLOGUARD TEST: ICD-10-CM

## 2023-01-30 ENCOUNTER — OFFICE VISIT (OUTPATIENT)
Dept: FAMILY MEDICINE CLINIC | Facility: CLINIC | Age: 63
End: 2023-01-30
Payer: COMMERCIAL

## 2023-01-30 VITALS
HEIGHT: 66 IN | WEIGHT: 208.6 LBS | HEART RATE: 106 BPM | TEMPERATURE: 97.3 F | SYSTOLIC BLOOD PRESSURE: 137 MMHG | BODY MASS INDEX: 33.52 KG/M2 | DIASTOLIC BLOOD PRESSURE: 78 MMHG | OXYGEN SATURATION: 99 %

## 2023-01-30 DIAGNOSIS — E53.8 B12 DEFICIENCY: ICD-10-CM

## 2023-01-30 DIAGNOSIS — R23.2 HOT FLASHES: ICD-10-CM

## 2023-01-30 DIAGNOSIS — M25.50 POLYARTHRALGIA: ICD-10-CM

## 2023-01-30 DIAGNOSIS — M79.7 FIBROMYALGIA: Primary | ICD-10-CM

## 2023-01-30 DIAGNOSIS — E78.2 MIXED HYPERLIPIDEMIA: ICD-10-CM

## 2023-01-30 DIAGNOSIS — R73.03 PREDIABETES: ICD-10-CM

## 2023-01-30 DIAGNOSIS — I10 ESSENTIAL HYPERTENSION: ICD-10-CM

## 2023-01-30 PROCEDURE — 99214 OFFICE O/P EST MOD 30 MIN: CPT | Performed by: NURSE PRACTITIONER

## 2023-01-30 RX ORDER — LORATADINE 10 MG/1
10 CAPSULE, LIQUID FILLED ORAL DAILY
COMMUNITY

## 2023-01-30 NOTE — PROGRESS NOTES
Answers for HPI/ROS submitted by the patient on 1/26/2023  Please describe your symptoms.: 6 month follow-up & need prescription renewals  Have you had these symptoms before?: Yes  How long have you been having these symptoms?: Greater than 2 weeks  Please list any medications you are currently taking for this condition.: Duloxetine 90 mg 1/day, Naproxen 500 mg /2day, Lisinopryl hctz 20-12.5 1/day, Metformin 500mg 1/day  What is the primary reason for your visit?: Other    Chief Complaint  Fibromyalgia (6 mo f/u) and Hypertension (Does not usually monitor BP at home.)    Subjective        Candie Arias presents to [facility]  History of Present Illness   Patient is here for follow-up on fibromyalgia, hypertension, no B12.  Patient reports that her achiness that had been improved on Cymbalta increasing recently.  She is not sure if it is due to packing up to move or that the medicine is not quite as effective.  She is taking and tolerating it without side effects.  She is still using compression on her left knee at night.  She has been using a compression brace on her left ankle for history of Achilles tendinopathy, reports that she thinks she should probably be in a boot but is hard to get all of her packing done in a boot.  She does have 1 if needed.  Continues with naproxen.    Prediabetes: Taking and tolerating metformin without side effects.  Tries to follow a lower carb diet most of the time.    Hypertension: She is surprised that her blood pressure is initially elevated when she came in.  She thinks it is usually very well controlled although she admits that it has not been checked by anyone in at least 6 months.  She does not check at home.    Low B12: Not had this checked in a while always feels fatigued.  Did not notice long period of improvement in her symptoms after replenished.    She had a positive Cologuard in November and has an appointment tomorrow with surgery for  "colon cancer screening-Dr Reynoso.    Has been having new hot flashes sometimes nightly and sometimes every few nights.  She is postmenopausal and has past history of left breast cancer status postmastectomy.    Plans to move on February 19 for [place] is looking forward to it.  She feels better overall in [place].  The feels like the weather agrees with her more.    Objective   Vital Signs:  /78   Pulse 106   Temp 97.3 °F (36.3 °C) (Temporal)   Ht 167.6 cm (66\")   Wt 94.6 kg (208 lb 9.6 oz)   SpO2 99%   BMI 33.67 kg/m²   Estimated body mass index is 33.67 kg/m² as calculated from the following:    Height as of this encounter: 167.6 cm (66\").    Weight as of this encounter: 94.6 kg (208 lb 9.6 oz).       BMI is >= 30 and <35. (Class 1 Obesity). The following options were offered after discussion;: weight loss educational material (shared in after visit summary)      Physical Exam  Vitals and nursing note reviewed.   Constitutional:       General: She is not in acute distress.     Appearance: She is well-developed. She is obese. She is not ill-appearing or diaphoretic.   HENT:      Head: Normocephalic and atraumatic.   Eyes:      General:         Right eye: No discharge.         Left eye: No discharge.      Conjunctiva/sclera: Conjunctivae normal.   Cardiovascular:      Rate and Rhythm: Normal rate and regular rhythm.      Heart sounds: Normal heart sounds.   Pulmonary:      Effort: Pulmonary effort is normal.      Breath sounds: Normal breath sounds.   Abdominal:      General: Bowel sounds are normal.      Palpations: Abdomen is soft.      Tenderness: There is no abdominal tenderness.   Musculoskeletal:         General: No deformity.      Comments: Gait smooth and steady   Skin:     General: Skin is warm and dry.   Neurological:      Mental Status: She is alert and oriented to person, place, and time.   Psychiatric:         Mood and Affect: Mood normal.         Behavior: Behavior normal.      "   Result Review :                   Assessment and Plan   Diagnoses and all orders for this visit:    1. Fibromyalgia (Primary)    2. Prediabetes  -     Hemoglobin A1c    3. Essential hypertension  -     CBC & Differential  -     Comprehensive Metabolic Panel  -     Microalbumin / Creatinine Urine Ratio - Urine, Clean Catch    4. Polyarthralgia    5. B12 deficiency  -     Vitamin B12    6. Hot flashes  -     Hemoglobin A1c  -     TSH Rfx On Abnormal To Free T4    7. Mixed hyperlipidemia  -     Lipid Panel With LDL / HDL Ratio    needs to get est with PCP     Fibromyalgia has been pretty stable with current 90 mg of Cymbalta daily.  She seems to be tolerating without side effects.  And we will see a reason at this point to change her medications and we will continue.    Prediabetes: This has also been stable.  We will continue metformin pending A1c.  May need to adjust metformin.      Hypertension: On repeat her blood pressure was close to goal goal.  Will continue current medication.  She has gained a couple pounds since last visit and also is likely stressed about moved so we will continue for now.  Recommend getting established with new PCP as soon as possible when she moves to [place].    Hyperlipidemia: Not on a statin.  We will check lipids today she is fasting.  Will add statin if indicated.  She has not really 1 and 1 in the past.    B12 deficiency.  She was followed by neurology and replenished.  We will check B12 today.  She is taking p.o. daily as directed.    Hot flashes: We will check A1c and thyroid but I recommend her seeing her GYN.  She does have history of breast cancer with bilateral mastectomies.  She does have uterus and ovaries.  This is new and recent so should have work-up.    She has an appointment upcoming for positive Cologuard.  She is not sure if she can get her colonoscopy scheduled before February moved but I stressed to her the importance of having it done before moving so that there  is not a lot of time in getting a colonoscopy.    She has refills currently and does not need any refills but I will give her a 90-day when needed until she can get established with new PCP.         Follow Up   Return if symptoms worsen or fail to improve.  Patient was given instructions and counseling regarding her condition or for health maintenance advice. Please see specific information pulled into the AVS if appropriate.

## 2023-01-31 LAB
ALBUMIN SERPL-MCNC: 4.6 G/DL (ref 3.5–5.2)
ALBUMIN/CREAT UR: 7 MG/G CREAT (ref 0–29)
ALBUMIN/GLOB SERPL: 2.7 G/DL
ALP SERPL-CCNC: 77 U/L (ref 39–117)
ALT SERPL-CCNC: 14 U/L (ref 1–33)
AST SERPL-CCNC: 16 U/L (ref 1–32)
BASOPHILS # BLD AUTO: 0.05 10*3/MM3 (ref 0–0.2)
BASOPHILS NFR BLD AUTO: 0.8 % (ref 0–1.5)
BILIRUB SERPL-MCNC: 0.3 MG/DL (ref 0–1.2)
BUN SERPL-MCNC: 17 MG/DL (ref 8–23)
BUN/CREAT SERPL: 24.6 (ref 7–25)
CALCIUM SERPL-MCNC: 10.1 MG/DL (ref 8.6–10.5)
CHLORIDE SERPL-SCNC: 105 MMOL/L (ref 98–107)
CHOLEST SERPL-MCNC: 216 MG/DL (ref 0–200)
CO2 SERPL-SCNC: 30.2 MMOL/L (ref 22–29)
CREAT SERPL-MCNC: 0.69 MG/DL (ref 0.57–1)
CREAT UR-MCNC: 80.7 MG/DL
EGFRCR SERPLBLD CKD-EPI 2021: 97.1 ML/MIN/1.73
EOSINOPHIL # BLD AUTO: 0.22 10*3/MM3 (ref 0–0.4)
EOSINOPHIL NFR BLD AUTO: 3.4 % (ref 0.3–6.2)
ERYTHROCYTE [DISTWIDTH] IN BLOOD BY AUTOMATED COUNT: 12.5 % (ref 12.3–15.4)
GLOBULIN SER CALC-MCNC: 1.7 GM/DL
GLUCOSE SERPL-MCNC: 90 MG/DL (ref 65–99)
HBA1C MFR BLD: 6.6 % (ref 4.8–5.6)
HCT VFR BLD AUTO: 42.2 % (ref 34–46.6)
HDLC SERPL-MCNC: 67 MG/DL (ref 40–60)
HGB BLD-MCNC: 13.8 G/DL (ref 12–15.9)
IMM GRANULOCYTES # BLD AUTO: 0.03 10*3/MM3 (ref 0–0.05)
IMM GRANULOCYTES NFR BLD AUTO: 0.5 % (ref 0–0.5)
LDLC SERPL CALC-MCNC: 115 MG/DL (ref 0–100)
LDLC/HDLC SERPL: 1.64 {RATIO}
LYMPHOCYTES # BLD AUTO: 1.17 10*3/MM3 (ref 0.7–3.1)
LYMPHOCYTES NFR BLD AUTO: 18.2 % (ref 19.6–45.3)
MCH RBC QN AUTO: 28.4 PG (ref 26.6–33)
MCHC RBC AUTO-ENTMCNC: 32.7 G/DL (ref 31.5–35.7)
MCV RBC AUTO: 86.8 FL (ref 79–97)
MICROALBUMIN UR-MCNC: 6 UG/ML
MONOCYTES # BLD AUTO: 0.57 10*3/MM3 (ref 0.1–0.9)
MONOCYTES NFR BLD AUTO: 8.9 % (ref 5–12)
NEUTROPHILS # BLD AUTO: 4.38 10*3/MM3 (ref 1.7–7)
NEUTROPHILS NFR BLD AUTO: 68.2 % (ref 42.7–76)
NRBC BLD AUTO-RTO: 0 /100 WBC (ref 0–0.2)
PLATELET # BLD AUTO: 338 10*3/MM3 (ref 140–450)
POTASSIUM SERPL-SCNC: 4.7 MMOL/L (ref 3.5–5.2)
PROT SERPL-MCNC: 6.3 G/DL (ref 6–8.5)
RBC # BLD AUTO: 4.86 10*6/MM3 (ref 3.77–5.28)
SODIUM SERPL-SCNC: 142 MMOL/L (ref 136–145)
TRIGL SERPL-MCNC: 196 MG/DL (ref 0–150)
TSH SERPL DL<=0.005 MIU/L-ACNC: 0.73 UIU/ML (ref 0.27–4.2)
VIT B12 SERPL-MCNC: >2000 PG/ML (ref 211–946)
VLDLC SERPL CALC-MCNC: 34 MG/DL (ref 5–40)
WBC # BLD AUTO: 6.42 10*3/MM3 (ref 3.4–10.8)

## 2023-02-01 ENCOUNTER — OFFICE VISIT (OUTPATIENT)
Dept: SURGERY | Facility: CLINIC | Age: 63
End: 2023-02-01
Payer: COMMERCIAL

## 2023-02-01 ENCOUNTER — PREP FOR SURGERY (OUTPATIENT)
Dept: OTHER | Facility: HOSPITAL | Age: 63
End: 2023-02-01
Payer: COMMERCIAL

## 2023-02-01 VITALS — BODY MASS INDEX: 33.17 KG/M2 | HEIGHT: 66 IN | WEIGHT: 206.4 LBS

## 2023-02-01 DIAGNOSIS — R19.5 POSITIVE COLORECTAL CANCER SCREENING USING COLOGUARD TEST: Primary | ICD-10-CM

## 2023-02-01 PROCEDURE — 99203 OFFICE O/P NEW LOW 30 MIN: CPT | Performed by: SURGERY

## 2023-02-06 NOTE — H&P (VIEW-ONLY)
General Surgery H&P/Consultation    Impression/Plan:    Ms. Candie Arias is a 63 y.o. with a positive colorectal cancer screening using Cologuard.  I discussed with her proceeding with diagnostic colonoscopy.  I discussed with her that a positive Cologuard test represents a 4% chance of finding a malignancy at the time of follow-up colonoscopy.  20% of the time the Cologuard is falsely positive meaning no polyps are encountered.  The remaining 76% of the time polyps are encountered and removed at the time of colonoscopy.  All risks (including bleeding and colon perforation), benefits, and alternatives were explained to the patient who agreed and wished to proceed.    Referring Provider: ALEJANDRO Lorenzo    Chief Complaint:    Positive Cologuard    History of Present Illness:    Ms. Candie Arias is a 63 y.o. lady who presents with a positive Cologuard screening.  She denies any significant family history of colon cancer.  She does have a personal history of breast cancer.  She denies any change in her stool habits including blood or change in caliber.    Past Medical History:   Past Medical History:   Diagnosis Date   • Allergic 20 years ago    mild   • Arthritis    • Back pain    • Benign essential hypertension    • Breast cancer (HCC) 2016   • Cancer (HCC)     breast   • Colon cancer screening 12/15/2022    COLOGUARD - POSITIVE   • Colon cancer screening 10/18/2019    COLOGUARD - NEGATIVE   • Diabetes mellitus (HCC)     borderline   • Fibromyalgia, primary 2 yrs   • Heart murmur    • HL (hearing loss)    • Hypertension    • Insomnia related to another mental disorder     ARTHRITIS   • Menopause    • Obesity           Past Surgical History:    Past Surgical History:   Procedure Laterality Date   • APPENDECTOMY N/A 1972   • BREAST BIOPSY Bilateral    • BREAST IMPLANT SURGERY Bilateral 2016   • COLPOSCOPY W/ BIOPSY / CURETTAGE N/A 11/19/2015    IN-OFFICE PROCEDURE: Dr. Marilia Uriostegui   • COLPOSCOPY W/  BIOPSY / CURETTAGE N/A 10/15/2015    IN-OFFICE PROCEDURE: Dr. Marilia Uriostegui   • JOINT REPLACEMENT     • LAPAROSCOPIC CHOLECYSTECTOMY  2002   • MASTECTOMY Bilateral 2016   • TOTAL KNEE ARTHROPLASTY           Family History:    Family History   Problem Relation Age of Onset   • Cancer Father         lung cancer   • Heart failure Mother    • Hearing loss Mother         hearing aids   • Stroke Mother    • Diabetes Other    • Breast cancer Other 38        SISTER'S DAUGHTER   • Testicular cancer Other         20S-SISTER'S SON    • Arthritis Sister         rheumatoid   • Hearing loss Sister         hearing aids   • Diabetes Maternal Grandmother          Social History:    Social History     Socioeconomic History   • Marital status:    Tobacco Use   • Smoking status: Never   • Smokeless tobacco: Never   Vaping Use   • Vaping Use: Never used   Substance and Sexual Activity   • Alcohol use: Yes     Comment: occasionally - maybe 2 glasses of wine/once a month   • Drug use: No   • Sexual activity: Yes     Partners: Male     Birth control/protection: Post-menopausal         Allergies:   Allergies   Allergen Reactions   • Rocephin [Ceftriaxone] Other (See Comments)     FEVER  INDUCED FEVER   • Metronidazole Myalgia   • Oxaprozin Unknown - High Severity     Patient to speak with PCP        Medications:     Current Outpatient Medications:   •  acetaminophen (TYLENOL) 650 MG 8 hr tablet, 1,300 mg 2 (Two) Times a Day. 2 tablet twice daily, Disp: , Rfl:   •  Ascorbic Acid (VITAMIN C PO), Take 1 tablet by mouth Daily., Disp: , Rfl:   •  Cholecalciferol (Vitamin D3) 50 MCG (2000 UT) chewable tablet, Chew 1 tablet Daily., Disp: , Rfl:   •  Cyanocobalamin (VITAMIN B 12 PO), Take 1,000 mcg by mouth Daily., Disp: , Rfl:   •  DULoxetine (CYMBALTA) 30 MG capsule, TAKE 1 CAPSULE BY MOUTH  DAILY WITH 60 MG FOR TOTAL  DOSE OF 90 MG FOR  FIBROMYALGIA, Disp: 90 capsule, Rfl: 1  •  DULoxetine (CYMBALTA) 60 MG capsule, TAKE 1 CAPSULE BY MOUTH   DAILY, Disp: 90 capsule, Rfl: 1  •  lisinopril-hydrochlorothiazide (PRINZIDE,ZESTORETIC) 20-12.5 MG per tablet, TAKE 1 TABLET BY MOUTH  DAILY, Disp: 90 tablet, Rfl: 0  •  Loratadine 10 MG capsule, Take 10 mg by mouth Daily., Disp: , Rfl:   •  metFORMIN (Glucophage) 500 MG tablet, Take 1 tablet by mouth 2 (Two) Times a Day With Meals. (Patient taking differently: Take 500 mg by mouth Daily With Breakfast.), Disp: 180 tablet, Rfl: 0  •  naproxen (NAPROSYN) 500 MG tablet, TAKE 1 TABLET BY MOUTH  TWICE DAILY WITH MEALS, Disp: 180 tablet, Rfl: 0    Radiology/Endoscopy:    • Positive Cologuard    Labs:    • Labs from 1/30/2023 reviewed, no anemia, hemoglobin A1c 6.6        Physical Exam:   • Constitutional: Well-developed well-nourished, no acute distress  • Eyes: Conjunctiva normal, sclera nonicteric  • ENMT: Hearing grossly normal, oral mucosa moist  • Neck: Supple, trachea midline  • Respiratory: No increased work of breathing, Symmetric excursion  • Cardiovascular: Well pefursed, no jugular venous distention evident   • Gastrointestinal: Nondistended  • Skin:  Warm, dry, no rash on visualized skin surfaces  • Musculoskeletal: Symmetric strength, normal gait  • Psychiatric: Alert and oriented ×3, normal affect       Julio Llamas MD  General and Endoscopic Surgery  [facility], [place], operated by [facility]    [address], [address]  [place], [place], [address]  P: [phone]  F: [phone]

## 2023-02-06 NOTE — PROGRESS NOTES
General Surgery H&P/Consultation    Impression/Plan:    Ms. Candie Arias is a 63 y.o. with a positive colorectal cancer screening using Cologuard.  I discussed with her proceeding with diagnostic colonoscopy.  I discussed with her that a positive Cologuard test represents a 4% chance of finding a malignancy at the time of follow-up colonoscopy.  20% of the time the Cologuard is falsely positive meaning no polyps are encountered.  The remaining 76% of the time polyps are encountered and removed at the time of colonoscopy.  All risks (including bleeding and colon perforation), benefits, and alternatives were explained to the patient who agreed and wished to proceed.    Referring Provider: ALEJANDRO Lorenzo    Chief Complaint:    Positive Cologuard    History of Present Illness:    Ms. Candie Arias is a 63 y.o. lady who presents with a positive Cologuard screening.  She denies any significant family history of colon cancer.  She does have a personal history of breast cancer.  She denies any change in her stool habits including blood or change in caliber.    Past Medical History:   Past Medical History:   Diagnosis Date   • Allergic 20 years ago    mild   • Arthritis    • Back pain    • Benign essential hypertension    • Breast cancer (HCC) 2016   • Cancer (HCC)     breast   • Colon cancer screening 12/15/2022    COLOGUARD - POSITIVE   • Colon cancer screening 10/18/2019    COLOGUARD - NEGATIVE   • Diabetes mellitus (HCC)     borderline   • Fibromyalgia, primary 2 yrs   • Heart murmur    • HL (hearing loss)    • Hypertension    • Insomnia related to another mental disorder     ARTHRITIS   • Menopause    • Obesity           Past Surgical History:    Past Surgical History:   Procedure Laterality Date   • APPENDECTOMY N/A 1972   • BREAST BIOPSY Bilateral    • BREAST IMPLANT SURGERY Bilateral 2016   • COLPOSCOPY W/ BIOPSY / CURETTAGE N/A 11/19/2015    IN-OFFICE PROCEDURE: Dr. Marilia Uriostegui   • COLPOSCOPY W/  BIOPSY / CURETTAGE N/A 10/15/2015    IN-OFFICE PROCEDURE: Dr. Marilia Uriostegui   • JOINT REPLACEMENT     • LAPAROSCOPIC CHOLECYSTECTOMY  2002   • MASTECTOMY Bilateral 2016   • TOTAL KNEE ARTHROPLASTY           Family History:    Family History   Problem Relation Age of Onset   • Cancer Father         lung cancer   • Heart failure Mother    • Hearing loss Mother         hearing aids   • Stroke Mother    • Diabetes Other    • Breast cancer Other 38        SISTER'S DAUGHTER   • Testicular cancer Other         20S-SISTER'S SON    • Arthritis Sister         rheumatoid   • Hearing loss Sister         hearing aids   • Diabetes Maternal Grandmother          Social History:    Social History     Socioeconomic History   • Marital status:    Tobacco Use   • Smoking status: Never   • Smokeless tobacco: Never   Vaping Use   • Vaping Use: Never used   Substance and Sexual Activity   • Alcohol use: Yes     Comment: occasionally - maybe 2 glasses of wine/once a month   • Drug use: No   • Sexual activity: Yes     Partners: Male     Birth control/protection: Post-menopausal         Allergies:   Allergies   Allergen Reactions   • Rocephin [Ceftriaxone] Other (See Comments)     FEVER  INDUCED FEVER   • Metronidazole Myalgia   • Oxaprozin Unknown - High Severity     Patient to speak with PCP        Medications:     Current Outpatient Medications:   •  acetaminophen (TYLENOL) 650 MG 8 hr tablet, 1,300 mg 2 (Two) Times a Day. 2 tablet twice daily, Disp: , Rfl:   •  Ascorbic Acid (VITAMIN C PO), Take 1 tablet by mouth Daily., Disp: , Rfl:   •  Cholecalciferol (Vitamin D3) 50 MCG (2000 UT) chewable tablet, Chew 1 tablet Daily., Disp: , Rfl:   •  Cyanocobalamin (VITAMIN B 12 PO), Take 1,000 mcg by mouth Daily., Disp: , Rfl:   •  DULoxetine (CYMBALTA) 30 MG capsule, TAKE 1 CAPSULE BY MOUTH  DAILY WITH 60 MG FOR TOTAL  DOSE OF 90 MG FOR  FIBROMYALGIA, Disp: 90 capsule, Rfl: 1  •  DULoxetine (CYMBALTA) 60 MG capsule, TAKE 1 CAPSULE BY MOUTH   DAILY, Disp: 90 capsule, Rfl: 1  •  lisinopril-hydrochlorothiazide (PRINZIDE,ZESTORETIC) 20-12.5 MG per tablet, TAKE 1 TABLET BY MOUTH  DAILY, Disp: 90 tablet, Rfl: 0  •  Loratadine 10 MG capsule, Take 10 mg by mouth Daily., Disp: , Rfl:   •  metFORMIN (Glucophage) 500 MG tablet, Take 1 tablet by mouth 2 (Two) Times a Day With Meals. (Patient taking differently: Take 500 mg by mouth Daily With Breakfast.), Disp: 180 tablet, Rfl: 0  •  naproxen (NAPROSYN) 500 MG tablet, TAKE 1 TABLET BY MOUTH  TWICE DAILY WITH MEALS, Disp: 180 tablet, Rfl: 0    Radiology/Endoscopy:    • Positive Cologuard    Labs:    • Labs from 1/30/2023 reviewed, no anemia, hemoglobin A1c 6.6        Physical Exam:   • Constitutional: Well-developed well-nourished, no acute distress  • Eyes: Conjunctiva normal, sclera nonicteric  • ENMT: Hearing grossly normal, oral mucosa moist  • Neck: Supple, trachea midline  • Respiratory: No increased work of breathing, Symmetric excursion  • Cardiovascular: Well pefursed, no jugular venous distention evident   • Gastrointestinal: Nondistended  • Skin:  Warm, dry, no rash on visualized skin surfaces  • Musculoskeletal: Symmetric strength, normal gait  • Psychiatric: Alert and oriented ×3, normal affect       Julio Llamas MD  General and Endoscopic Surgery  [facility]    [address], [address]  [place], [place], [address]  P: [phone]  F: [phone]

## 2023-02-09 ENCOUNTER — HOSPITAL ENCOUNTER (OUTPATIENT)
Facility: HOSPITAL | Age: 63
Setting detail: HOSPITAL OUTPATIENT SURGERY
Discharge: HOME | End: 2023-02-09
Attending: SURGERY | Admitting: SURGERY
Payer: COMMERCIAL

## 2023-02-09 ENCOUNTER — ANESTHESIA EVENT (OUTPATIENT)
Dept: GASTROENTEROLOGY | Facility: HOSPITAL | Age: 63
End: 2023-02-09
Payer: COMMERCIAL

## 2023-02-09 ENCOUNTER — ANESTHESIA (OUTPATIENT)
Dept: GASTROENTEROLOGY | Facility: HOSPITAL | Age: 63
End: 2023-02-09
Payer: COMMERCIAL

## 2023-02-09 VITALS
HEART RATE: 81 BPM | BODY MASS INDEX: 32.3 KG/M2 | WEIGHT: 201 LBS | HEIGHT: 66 IN | DIASTOLIC BLOOD PRESSURE: 74 MMHG | OXYGEN SATURATION: 95 % | RESPIRATION RATE: 19 BRPM | TEMPERATURE: 98.8 F | SYSTOLIC BLOOD PRESSURE: 129 MMHG

## 2023-02-09 LAB — GLUCOSE BLDC GLUCOMTR-MCNC: 88 MG/DL (ref 70–130)

## 2023-02-09 PROCEDURE — 25010000002 PROPOFOL 10 MG/ML EMULSION: Performed by: ANESTHESIOLOGY

## 2023-02-09 PROCEDURE — 45378 DIAGNOSTIC COLONOSCOPY: CPT | Performed by: SURGERY

## 2023-02-09 PROCEDURE — 82962 GLUCOSE BLOOD TEST: CPT

## 2023-02-09 RX ORDER — SODIUM CHLORIDE 0.9 % (FLUSH) 0.9 %
10 SYRINGE (ML) INJECTION AS NEEDED
Status: DISCONTINUED | OUTPATIENT
Start: 2023-02-09 | End: 2023-02-09 | Stop reason: HOSPADM

## 2023-02-09 RX ORDER — SODIUM CHLORIDE, SODIUM LACTATE, POTASSIUM CHLORIDE, CALCIUM CHLORIDE 600; 310; 30; 20 MG/100ML; MG/100ML; MG/100ML; MG/100ML
30 INJECTION, SOLUTION INTRAVENOUS CONTINUOUS PRN
Status: DISCONTINUED | OUTPATIENT
Start: 2023-02-09 | End: 2023-02-09 | Stop reason: HOSPADM

## 2023-02-09 RX ORDER — PROPOFOL 10 MG/ML
VIAL (ML) INTRAVENOUS CONTINUOUS PRN
Status: DISCONTINUED | OUTPATIENT
Start: 2023-02-09 | End: 2023-02-09 | Stop reason: SURG

## 2023-02-09 RX ORDER — PROPOFOL 10 MG/ML
VIAL (ML) INTRAVENOUS AS NEEDED
Status: DISCONTINUED | OUTPATIENT
Start: 2023-02-09 | End: 2023-02-09 | Stop reason: SURG

## 2023-02-09 RX ADMIN — Medication 140 MCG/KG/MIN: at 13:10

## 2023-02-09 RX ADMIN — PROPOFOL 150 MG: 10 INJECTION, EMULSION INTRAVENOUS at 13:10

## 2023-02-09 RX ADMIN — SODIUM CHLORIDE, POTASSIUM CHLORIDE, SODIUM LACTATE AND CALCIUM CHLORIDE 30 ML/HR: 600; 310; 30; 20 INJECTION, SOLUTION INTRAVENOUS at 13:02

## 2023-02-09 NOTE — ANESTHESIA PREPROCEDURE EVALUATION
Anesthesia Evaluation                  Airway   Mallampati: II  Dental      Pulmonary    (-) asthma, sleep apnea, not a smoker    ROS comment: Negative patient screen for NORA    Cardiovascular     (+) hypertension,       Neuro/Psych  (+) psychiatric history,    GI/Hepatic/Renal/Endo    (+) obesity,   diabetes mellitus type 2,     Musculoskeletal     (+) back pain, myalgias,   Abdominal    Substance History      OB/GYN          Other                        Anesthesia Plan    ASA 3     MAC       Anesthetic plan, risks, benefits, and alternatives have been provided, discussed and informed consent has been obtained with: patient.        CODE STATUS:

## 2023-02-09 NOTE — ANESTHESIA POSTPROCEDURE EVALUATION
"Patient: Candie Arias    Procedure Summary     Date: 02/09/23 [facility]    Anesthesia Start: 1307 Anesthesia Stop: 1340    Procedure: COLONOSCOPY TO CECUM Diagnosis:       Positive colorectal cancer screening using Cologuard test      (Positive colorectal cancer screening using Cologuard test [R19.5])    Surgeons: Julio Llamas MD Provider: Kyle Cox MD    Anesthesia Type: MAC ASA Status: 3          Anesthesia Type: MAC    Vitals  No vitals data found for the desired time range.          Post Anesthesia Care and Evaluation    Pain management: adequate    Airway patency: patent  Anesthetic complications: No anesthetic complications    Cardiovascular status: acceptable  Respiratory status: acceptable  Hydration status: acceptable    Comments: /77 (BP Location: Left arm, Patient Position: Sitting)   Pulse 102   Temp 37.1 °C (98.8 °F) (Oral)   Resp 20   Ht 167.6 cm (66\")   Wt 91.2 kg (201 lb)   LMP  (LMP Unknown)   SpO2 96%   BMI 32.44 kg/m²         "

## 2023-02-09 NOTE — OP NOTE
Colonoscopy Procedure Note  Candie Arias  1960  Date of Procedure: 02/09/23    Pre-operative Diagnosis:    Positive Cologuard    Post-operative Diagnosis:  Normal colonoscopy, sigmoid diverticulosis    Procedure: Colonoscopy to cecum    Findings/Treatments:   No evidence of polyps or masses.  Single small diverticulum in the sigmoid colon visualized       Recommendations:   Colonoscopy in 10 years.  The office will call within the next  3-10 days with a final recommendation.  Keep a copy of the photographs of the procedure given to you today for possible need for reference in the future.      Surgeon: Julio Llamas MD    Anesthetic: MAC per Kyle Cox MD    Procedure Details:    MAC anesthesia was induced.  A digital rectal exam was performed along with visual inspection of the anus. The 180 Colonoscope was inserted into the rectum and advanced to the cecum, with assistance with abdominal pressure.    Cecum was identified by the appendiceal orifice and the ileocecal valve and photographed for documentation.       A careful inspection was made as the scope was withdrawn, including a retroflexed view of the rectum.  There was no evidence of polyps or masses.  A tiny sigmoid diverticulum was noted but no significant diverticulosis.  Retroflexion in the rectum revealed no abnormalities. Prep quality was excellent.      Julio Llamas M.D.  General and Endoscopic Surgery  [facility]    [address], [address]  [place], [place], [address]  P: [phone]  F: [phone]

## 2023-02-13 DIAGNOSIS — M79.7 FIBROMYALGIA: ICD-10-CM

## 2023-02-15 RX ORDER — NAPROXEN 500 MG/1
TABLET ORAL
Qty: 180 TABLET | Refills: 0 | Status: SHIPPED | OUTPATIENT
Start: 2023-02-15

## 2023-03-03 DIAGNOSIS — I10 ESSENTIAL HYPERTENSION: ICD-10-CM

## 2023-03-03 RX ORDER — LISINOPRIL AND HYDROCHLOROTHIAZIDE 20; 12.5 MG/1; MG/1
1 TABLET ORAL DAILY
Qty: 90 TABLET | Refills: 0 | Status: SHIPPED | OUTPATIENT
Start: 2023-03-03

## 2023-04-11 DIAGNOSIS — G89.29 OTHER CHRONIC PAIN: ICD-10-CM

## 2023-04-11 DIAGNOSIS — M25.50 POLYARTHRALGIA: ICD-10-CM

## 2023-04-11 DIAGNOSIS — I10 ESSENTIAL HYPERTENSION: ICD-10-CM

## 2023-04-11 DIAGNOSIS — M79.7 FIBROMYALGIA: ICD-10-CM

## 2023-04-12 RX ORDER — LISINOPRIL AND HYDROCHLOROTHIAZIDE 20; 12.5 MG/1; MG/1
1 TABLET ORAL DAILY
Qty: 90 TABLET | Refills: 0 | Status: SHIPPED | OUTPATIENT
Start: 2023-04-12

## 2023-04-12 RX ORDER — DULOXETIN HYDROCHLORIDE 60 MG/1
60 CAPSULE, DELAYED RELEASE ORAL DAILY
Qty: 90 CAPSULE | Refills: 0 | Status: SHIPPED | OUTPATIENT
Start: 2023-04-12

## 2023-04-12 RX ORDER — DULOXETIN HYDROCHLORIDE 30 MG/1
60 CAPSULE, DELAYED RELEASE ORAL DAILY
Qty: 90 CAPSULE | Refills: 0 | Status: SHIPPED | OUTPATIENT
Start: 2023-04-12

## 2023-04-17 DIAGNOSIS — M79.7 FIBROMYALGIA: ICD-10-CM

## 2023-04-18 RX ORDER — NAPROXEN 500 MG/1
TABLET ORAL
Qty: 180 TABLET | Refills: 3 | OUTPATIENT
Start: 2023-04-18

## 2023-04-18 NOTE — TELEPHONE ENCOUNTER
Rx Refill Note  Requested Prescriptions     Pending Prescriptions Disp Refills   • naproxen (NAPROSYN) 500 MG tablet [Pharmacy Med Name: Naproxen 500 MG Oral Tablet] 180 tablet 3     Sig: TAKE 1 TABLET BY MOUTH TWICE  DAILY WITH MEALS      Last office visit with prescribing clinician: 1/30/2023   Last telemedicine visit with prescribing clinician: Visit date not found   Next office visit with prescribing clinician: Visit date not found                         Would you like a call back once the refill request has been completed: [] Yes [] No    If the office needs to give you a call back, can they leave a voicemail: [] Yes [] No    Bernadette Adams MA  04/18/23, 09:28 EDT

## 2023-06-19 DIAGNOSIS — M79.7 FIBROMYALGIA: ICD-10-CM

## 2023-06-19 DIAGNOSIS — M25.50 POLYARTHRALGIA: ICD-10-CM

## 2023-06-19 DIAGNOSIS — G89.29 OTHER CHRONIC PAIN: ICD-10-CM

## 2023-06-19 DIAGNOSIS — R73.03 PREDIABETES: Primary | ICD-10-CM

## 2023-06-19 DIAGNOSIS — I10 ESSENTIAL HYPERTENSION: ICD-10-CM

## 2023-06-19 RX ORDER — DULOXETIN HYDROCHLORIDE 30 MG/1
60 CAPSULE, DELAYED RELEASE ORAL DAILY
Qty: 90 CAPSULE | Refills: 0 | Status: SHIPPED | OUTPATIENT
Start: 2023-06-19

## 2023-06-19 RX ORDER — NAPROXEN 500 MG/1
500 TABLET ORAL 2 TIMES DAILY WITH MEALS
Qty: 180 TABLET | Refills: 0 | Status: SHIPPED | OUTPATIENT
Start: 2023-06-19

## 2023-06-19 RX ORDER — LISINOPRIL AND HYDROCHLOROTHIAZIDE 20; 12.5 MG/1; MG/1
1 TABLET ORAL DAILY
Qty: 90 TABLET | Refills: 0 | Status: SHIPPED | OUTPATIENT
Start: 2023-06-19

## 2023-06-19 RX ORDER — DULOXETIN HYDROCHLORIDE 60 MG/1
60 CAPSULE, DELAYED RELEASE ORAL DAILY
Qty: 90 CAPSULE | Refills: 0 | Status: SHIPPED | OUTPATIENT
Start: 2023-06-19

## 2023-07-24 DIAGNOSIS — M25.50 POLYARTHRALGIA: ICD-10-CM

## 2023-07-24 DIAGNOSIS — G89.29 OTHER CHRONIC PAIN: ICD-10-CM

## 2023-07-24 DIAGNOSIS — M79.7 FIBROMYALGIA: ICD-10-CM

## 2023-07-26 NOTE — TELEPHONE ENCOUNTER
Rx Refill Note  Requested Prescriptions     Pending Prescriptions Disp Refills    DULoxetine (CYMBALTA) 30 MG capsule [Pharmacy Med Name: DULoxetine HCL DR 30 MG CAPSULE] 60 capsule      Sig: TAKE TWO CAPSULES BY MOUTH DAILY    DULoxetine (CYMBALTA) 60 MG capsule [Pharmacy Med Name: DULoxetine HCL DR 60 MG CAPSULE] 90 capsule 0     Sig: TAKE 1 CAPSULE BY MOUTH DAILY. TAKE WITH 30 MG DOSE FOR TOTAL OF 90 MG      Last office visit with prescribing clinician: 1/30/2023   Last telemedicine visit with prescribing clinician: Visit date not found   Next office visit with prescribing clinician: Visit date not found       {TIP  Please add Last Relevant Lab Date if appropriate: 01/30/23                 Would you like a call back once the refill request has been completed: [] Yes [] No    If the office needs to give you a call back, can they leave a voicemail: [] Yes [] No    Paige Banda MA  07/26/23, 07:17 EDT

## 2023-07-27 RX ORDER — DULOXETIN HYDROCHLORIDE 60 MG/1
60 CAPSULE, DELAYED RELEASE ORAL DAILY
Qty: 90 CAPSULE | Refills: 0 | OUTPATIENT
Start: 2023-07-27

## 2023-07-27 RX ORDER — DULOXETIN HYDROCHLORIDE 30 MG/1
CAPSULE, DELAYED RELEASE ORAL
Qty: 60 CAPSULE | OUTPATIENT
Start: 2023-07-27

## 2023-08-13 DIAGNOSIS — R73.03 PREDIABETES: ICD-10-CM

## 2023-08-14 NOTE — TELEPHONE ENCOUNTER
PATIENT HAS MOVED TO [place] AND IS GOING TO FIND A DOCTOR UP THERE. PATIENT STATES SHE NO LONGER NEEDS THIS PRESCRIPTION FROM US.

## 2023-08-14 NOTE — TELEPHONE ENCOUNTER
Rx Refill Note  Requested Prescriptions     Pending Prescriptions Disp Refills    metFORMIN (GLUCOPHAGE) 500 MG tablet [Pharmacy Med Name: metFORMIN HCl 500 MG Oral Tablet] 90 tablet 3     Sig: TAKE 1 TABLET BY MOUTH DAILY  WITH BREAKFAST      Last office visit with prescribing clinician: 1/30/2023   Last telemedicine visit with prescribing clinician: Visit date not found   Next office visit with prescribing clinician: Visit date not found                         Would you like a call back once the refill request has been completed: [] Yes [] No    If the office needs to give you a call back, can they leave a voicemail: [] Yes [] No    Viry Gabriel LPN  08/14/23, 12:22 EDT

## 2023-08-30 DIAGNOSIS — I10 ESSENTIAL HYPERTENSION: ICD-10-CM

## 2023-08-31 RX ORDER — LISINOPRIL AND HYDROCHLOROTHIAZIDE 20; 12.5 MG/1; MG/1
1 TABLET ORAL DAILY
Qty: 90 TABLET | Refills: 3 | OUTPATIENT
Start: 2023-08-31

## 2023-08-31 NOTE — TELEPHONE ENCOUNTER
Rx Refill Note  Requested Prescriptions     Pending Prescriptions Disp Refills    lisinopril-hydrochlorothiazide (PRINZIDE,ZESTORETIC) 20-12.5 MG per tablet [Pharmacy Med Name: Lisinopril-hydroCHLOROthiazide 20-12.5 MG Oral Tablet] 90 tablet 3     Sig: TAKE 1 TABLET BY MOUTH DAILY      Last office visit with prescribing clinician: 1/30/2023   Last telemedicine visit with prescribing clinician: Visit date not found   Next office visit with prescribing clinician: Visit date not found                         Would you like a call back once the refill request has been completed: [] Yes [] No    If the office needs to give you a call back, can they leave a voicemail: [] Yes [] No    Bernadette Adams MA  08/31/23, 12:37 EDT

## (undated) DEVICE — LN SMPL CO2 SHTRM SD STREAM W/M LUER

## (undated) DEVICE — SENSR O2 OXIMAX FNGR A/ 18IN NONSTR

## (undated) DEVICE — TUBING, SUCTION, 1/4" X 10', STRAIGHT: Brand: MEDLINE

## (undated) DEVICE — KT ORCA ORCAPOD DISP STRL

## (undated) DEVICE — ADAPT CLN BIOGUARD AIR/H2O DISP

## (undated) DEVICE — CANN O2 ETCO2 FITS ALL CONN CO2 SMPL A/ 7IN DISP LF